# Patient Record
Sex: MALE | Race: WHITE | NOT HISPANIC OR LATINO | Employment: FULL TIME | URBAN - METROPOLITAN AREA
[De-identification: names, ages, dates, MRNs, and addresses within clinical notes are randomized per-mention and may not be internally consistent; named-entity substitution may affect disease eponyms.]

---

## 2018-02-06 ENCOUNTER — CONVERSION ENCOUNTER (OUTPATIENT)
Dept: FAMILY MEDICINE CLINIC | Facility: CLINIC | Age: 48
End: 2018-02-06

## 2018-02-06 ENCOUNTER — OFFICE VISIT CONVERTED (OUTPATIENT)
Dept: FAMILY MEDICINE CLINIC | Facility: CLINIC | Age: 48
End: 2018-02-06
Attending: PHYSICIAN ASSISTANT

## 2018-03-30 ENCOUNTER — OFFICE VISIT CONVERTED (OUTPATIENT)
Dept: FAMILY MEDICINE CLINIC | Facility: CLINIC | Age: 48
End: 2018-03-30
Attending: FAMILY MEDICINE

## 2018-08-02 ENCOUNTER — OFFICE VISIT CONVERTED (OUTPATIENT)
Dept: FAMILY MEDICINE CLINIC | Facility: CLINIC | Age: 48
End: 2018-08-02
Attending: NURSE PRACTITIONER

## 2018-08-24 ENCOUNTER — OFFICE VISIT CONVERTED (OUTPATIENT)
Dept: FAMILY MEDICINE CLINIC | Facility: CLINIC | Age: 48
End: 2018-08-24
Attending: PHYSICIAN ASSISTANT

## 2018-08-24 ENCOUNTER — CONVERSION ENCOUNTER (OUTPATIENT)
Dept: FAMILY MEDICINE CLINIC | Facility: CLINIC | Age: 48
End: 2018-08-24

## 2018-10-02 ENCOUNTER — OFFICE VISIT CONVERTED (OUTPATIENT)
Dept: FAMILY MEDICINE CLINIC | Facility: CLINIC | Age: 48
End: 2018-10-02
Attending: FAMILY MEDICINE

## 2018-11-19 ENCOUNTER — OFFICE VISIT CONVERTED (OUTPATIENT)
Dept: OTOLARYNGOLOGY | Facility: CLINIC | Age: 48
End: 2018-11-19
Attending: OTOLARYNGOLOGY

## 2019-03-25 ENCOUNTER — CONVERSION ENCOUNTER (OUTPATIENT)
Dept: FAMILY MEDICINE CLINIC | Facility: CLINIC | Age: 49
End: 2019-03-25

## 2019-03-25 ENCOUNTER — HOSPITAL ENCOUNTER (OUTPATIENT)
Dept: LAB | Facility: HOSPITAL | Age: 49
Discharge: HOME | End: 2019-03-25
Attending: FAMILY MEDICINE

## 2019-03-25 ENCOUNTER — OFFICE VISIT CONVERTED (OUTPATIENT)
Dept: FAMILY MEDICINE CLINIC | Facility: CLINIC | Age: 49
End: 2019-03-25
Attending: FAMILY MEDICINE

## 2019-03-25 LAB
APPEARANCE UR: ABNORMAL
BILIRUB UR QL: NEGATIVE
COLOR UR: YELLOW
CONV BACTERIA: NEGATIVE
CONV COLLECTION SOURCE (UA): ABNORMAL
CONV HYALINE CASTS IN URINE MICRO: ABNORMAL /[LPF]
CONV UROBILINOGEN IN URINE BY AUTOMATED TEST STRIP: 0.2 {EHRLICHU}/DL (ref 0.1–1)
GLUCOSE UR QL: NEGATIVE MG/DL
HGB UR QL STRIP: ABNORMAL
KETONES UR QL STRIP: NEGATIVE MG/DL
LEUKOCYTE ESTERASE UR QL STRIP: NEGATIVE
NITRITE UR QL STRIP: NEGATIVE
PH UR STRIP.AUTO: 5.5 [PH] (ref 5–8)
PROT UR QL: NEGATIVE MG/DL
RBC #/AREA URNS HPF: ABNORMAL /[HPF]
SP GR UR: 1.03 (ref 1–1.03)
WBC #/AREA URNS HPF: ABNORMAL /[HPF]

## 2019-05-03 ENCOUNTER — CONVERSION ENCOUNTER (OUTPATIENT)
Dept: GASTROENTEROLOGY | Facility: CLINIC | Age: 49
End: 2019-05-03
Attending: INTERNAL MEDICINE

## 2019-06-04 ENCOUNTER — HOSPITAL ENCOUNTER (OUTPATIENT)
Dept: GASTROENTEROLOGY | Facility: HOSPITAL | Age: 49
Setting detail: HOSPITAL OUTPATIENT SURGERY
Discharge: HOME | End: 2019-06-04
Attending: INTERNAL MEDICINE

## 2020-04-07 ENCOUNTER — TELEMEDICINE CONVERTED (OUTPATIENT)
Dept: FAMILY MEDICINE CLINIC | Facility: CLINIC | Age: 50
End: 2020-04-07
Attending: INTERNAL MEDICINE

## 2021-01-28 ENCOUNTER — OFFICE VISIT CONVERTED (OUTPATIENT)
Dept: FAMILY MEDICINE CLINIC | Age: 51
End: 2021-01-28
Attending: NURSE PRACTITIONER

## 2021-03-02 ENCOUNTER — OFFICE VISIT CONVERTED (OUTPATIENT)
Dept: FAMILY MEDICINE CLINIC | Age: 51
End: 2021-03-02
Attending: FAMILY MEDICINE

## 2021-04-03 ENCOUNTER — HOSPITAL ENCOUNTER (OUTPATIENT)
Dept: OTHER | Facility: HOSPITAL | Age: 51
Discharge: HOME | End: 2021-04-03
Attending: FAMILY MEDICINE

## 2021-04-03 LAB
25(OH)D3 SERPL-MCNC: 40.8 NG/ML (ref 30–100)
ALBUMIN SERPL-MCNC: 4.2 G/DL (ref 3.5–5)
ALBUMIN/GLOB SERPL: 1.8 {RATIO} (ref 1.4–2.6)
ALP SERPL-CCNC: 55 U/L (ref 53–128)
ALT SERPL-CCNC: 19 U/L (ref 10–40)
ANION GAP SERPL CALC-SCNC: 12 MMOL/L (ref 8–19)
AST SERPL-CCNC: 18 U/L (ref 15–50)
BASOPHILS # BLD AUTO: 0.03 10*3/UL (ref 0–0.2)
BASOPHILS NFR BLD AUTO: 0.4 % (ref 0–3)
BILIRUB SERPL-MCNC: 0.68 MG/DL (ref 0.2–1.3)
BUN SERPL-MCNC: 16 MG/DL (ref 5–25)
BUN/CREAT SERPL: 14 {RATIO} (ref 6–20)
CALCIUM SERPL-MCNC: 9.4 MG/DL (ref 8.7–10.4)
CHLORIDE SERPL-SCNC: 104 MMOL/L (ref 99–111)
CHOLEST SERPL-MCNC: 164 MG/DL (ref 107–200)
CHOLEST/HDLC SERPL: 4.1 {RATIO} (ref 3–6)
CONV ABS IMM GRAN: 0.01 10*3/UL (ref 0–0.2)
CONV CO2: 29 MMOL/L (ref 22–32)
CONV IMMATURE GRAN: 0.1 % (ref 0–1.8)
CONV TOTAL PROTEIN: 6.6 G/DL (ref 6.3–8.2)
CREAT UR-MCNC: 1.16 MG/DL (ref 0.7–1.2)
DEPRECATED RDW RBC AUTO: 42 FL (ref 35.1–43.9)
EOSINOPHIL # BLD AUTO: 0.07 10*3/UL (ref 0–0.7)
EOSINOPHIL # BLD AUTO: 1 % (ref 0–7)
ERYTHROCYTE [DISTWIDTH] IN BLOOD BY AUTOMATED COUNT: 11.9 % (ref 11.6–14.4)
GFR SERPLBLD BASED ON 1.73 SQ M-ARVRAT: >60 ML/MIN/{1.73_M2}
GLOBULIN UR ELPH-MCNC: 2.4 G/DL (ref 2–3.5)
GLUCOSE SERPL-MCNC: 91 MG/DL (ref 70–99)
HCT VFR BLD AUTO: 48.6 % (ref 42–52)
HDLC SERPL-MCNC: 40 MG/DL (ref 40–60)
HGB BLD-MCNC: 15.8 G/DL (ref 14–18)
LDLC SERPL CALC-MCNC: 106 MG/DL (ref 70–100)
LYMPHOCYTES # BLD AUTO: 2.25 10*3/UL (ref 1–5)
LYMPHOCYTES NFR BLD AUTO: 31.3 % (ref 20–45)
MCH RBC QN AUTO: 31.1 PG (ref 27–31)
MCHC RBC AUTO-ENTMCNC: 32.5 G/DL (ref 33–37)
MCV RBC AUTO: 95.7 FL (ref 80–96)
MONOCYTES # BLD AUTO: 0.57 10*3/UL (ref 0.2–1.2)
MONOCYTES NFR BLD AUTO: 7.9 % (ref 3–10)
NEUTROPHILS # BLD AUTO: 4.26 10*3/UL (ref 2–8)
NEUTROPHILS NFR BLD AUTO: 59.3 % (ref 30–85)
NRBC CBCN: 0 % (ref 0–0.7)
OSMOLALITY SERPL CALC.SUM OF ELEC: 291 MOSM/KG (ref 273–304)
PLATELET # BLD AUTO: 251 10*3/UL (ref 130–400)
PMV BLD AUTO: 10.9 FL (ref 9.4–12.4)
POTASSIUM SERPL-SCNC: 4.6 MMOL/L (ref 3.5–5.3)
RBC # BLD AUTO: 5.08 10*6/UL (ref 4.7–6.1)
SODIUM SERPL-SCNC: 140 MMOL/L (ref 135–147)
TRIGL SERPL-MCNC: 91 MG/DL (ref 40–150)
TSH SERPL-ACNC: 0.61 M[IU]/L (ref 0.27–4.2)
VLDLC SERPL-MCNC: 18 MG/DL (ref 5–37)
WBC # BLD AUTO: 7.19 10*3/UL (ref 4.8–10.8)

## 2021-05-12 NOTE — PROGRESS NOTES
Quick Note      Patient Name: Lavell Pires   Patient ID: [id number]   Sex: Male   YOB: 1970    Primary Care Provider: Villa Gomez DO    Visit Date: April 7, 2020    Provider: Villa Gomez DO   Location: [facility]   Location Address: [address] [place] [address], [address]  [place], [place]  [id number]   Location Phone: [phone]          History Of Present Illness  TELEHEALTH VISIT  Chief Complaint: Follow up   Lavell Pires is a 49 year old /White male who is presenting for evaluation via telehealth visit. Verbal consent obtained before beginning visit.   Provider spent 7 minutes with the patient during telehealth visit.   The following staff were present during this visit: Laura Meza/Villa Gomez DO   Past Medical History/Overview of Patient Symptoms     Follow up     Pt has no concerns at this time. We discussed his elevated LDL and measures that could be taken dietary wise to improve this number as well as his borderline HDL. We also discussed Niacin use.    Pt will need refills on medications.           Assessment  · Mixed hyperlipidemia     272.2/E78.2  · Hypertension     401.9/I10  · Allergic rhinitis     477.9/J30.9      Plan  · Orders  o CBC with Auto Diff [facility] ([id number]) - 401.9/I10, 272.2/E78.2 - 12/07/2020  o CMP [facility] ([id number]) - 401.9/I10, 272.2/E78.2 - 12/07/2020  o Hgb A1c [facility] ([id number]) - 401.9/I10 - 12/07/2020  o Lipid Panel [facility] ([id number]) - 272.2/E78.2 - 12/07/2020  o Physican Telephone evaluation, 5-10 min ([id number]) - 401.9/I10, 477.9/J30.9, 272.2/E78.2 - 04/07/2020  o ACO - Advance Care Plan or Surrogate Decision Maker documented in EMR (1123F) - - 04/07/2020  o ACO-14: Influenza immunization administered or previously received () - - 04/07/2020  · Instructions  o Advised that cheeses and other sources of dairy fats, animal fats, fast food, and the extras (candy, pastries, pies, doughnuts and cookies) all contain LDL raising nutrients. Advised to increase fruits,  vegetables, whole grains, and to monitor portion sizes.   o Plan Of Care: Discussed, Coordinated, Educated patient  o Chronic conditions reviewed and taken into consideration for today's treatment plan.  o Patient instructed to seek medical attention urgently for new or worsening symptoms.  o Patient is taking medications as prescribed and doing well.   o Take all medications as prescribed/directed.  o Patient instructed/educated on their diet and exercise program.  o Patient counseled to reduce calorie intake.  o Patient was instructed to exercise regularly.  o Call the office with any concerns or questions.  · Disposition  o Follow up in one year.            Electronically Signed by: Villa Gomez, DO -Author on April 7, 2020 09:32:29 AM

## 2021-05-15 VITALS
BODY MASS INDEX: 27.11 KG/M2 | SYSTOLIC BLOOD PRESSURE: 140 MMHG | WEIGHT: 183 LBS | HEART RATE: 83 BPM | HEIGHT: 69 IN | OXYGEN SATURATION: 97 % | DIASTOLIC BLOOD PRESSURE: 79 MMHG

## 2021-05-16 VITALS
WEIGHT: 186 LBS | TEMPERATURE: 97.3 F | HEIGHT: 69 IN | RESPIRATION RATE: 16 BRPM | BODY MASS INDEX: 27.55 KG/M2 | SYSTOLIC BLOOD PRESSURE: 130 MMHG | OXYGEN SATURATION: 100 % | HEART RATE: 69 BPM | DIASTOLIC BLOOD PRESSURE: 86 MMHG

## 2021-05-16 VITALS
HEART RATE: 66 BPM | DIASTOLIC BLOOD PRESSURE: 82 MMHG | HEIGHT: 69 IN | WEIGHT: 176.25 LBS | TEMPERATURE: 97.3 F | BODY MASS INDEX: 26.11 KG/M2 | SYSTOLIC BLOOD PRESSURE: 126 MMHG

## 2021-05-16 VITALS
HEART RATE: 74 BPM | SYSTOLIC BLOOD PRESSURE: 131 MMHG | WEIGHT: 183 LBS | TEMPERATURE: 98.1 F | DIASTOLIC BLOOD PRESSURE: 93 MMHG | OXYGEN SATURATION: 100 %

## 2021-05-16 VITALS
SYSTOLIC BLOOD PRESSURE: 118 MMHG | WEIGHT: 181 LBS | DIASTOLIC BLOOD PRESSURE: 80 MMHG | OXYGEN SATURATION: 100 % | HEART RATE: 60 BPM

## 2021-05-16 VITALS
TEMPERATURE: 98.3 F | WEIGHT: 180.12 LBS | HEART RATE: 65 BPM | HEIGHT: 69 IN | SYSTOLIC BLOOD PRESSURE: 136 MMHG | DIASTOLIC BLOOD PRESSURE: 86 MMHG | OXYGEN SATURATION: 100 % | BODY MASS INDEX: 26.68 KG/M2

## 2021-05-16 VITALS
WEIGHT: 182.37 LBS | HEIGHT: 69 IN | DIASTOLIC BLOOD PRESSURE: 77 MMHG | BODY MASS INDEX: 27.01 KG/M2 | HEART RATE: 66 BPM | SYSTOLIC BLOOD PRESSURE: 140 MMHG

## 2021-05-18 NOTE — PROGRESS NOTES
Lavell Pires  1970     Office/Outpatient Visit    Visit Date: Thu, Jan 28, 2021 09:34 am    Provider: Maria Del Rosario Parmar N.P. (Assistant: Dixie Arce MA)    Location: [facility]        Electronically signed by Maria Del Rosario Parmar N.P. on  01/28/2021 10:09:41 AM                             Subjective:        CC: Mr. Pires is a 50 year old male.  Medication refills (VIDEO call ) Today's encounter is being done with a telehealth visit. He has consented verbally with two witnesses for todays treatment. Todays visit is being conducted by audio and video. Individuals present during the telemedicine consultation include Dixie Arce MA         HPI:           PHQ-9 Depression Screening: Completed form scanned and in chart; Total Score 0           In regard to the allergic rhinitis, unspecified, the allergy pattern seems to be seasonal.  Symptoms seem better with Montelukast, antihistamine as needed.        Hx Vitamin D deficiency. Reports that he was started on Vitamin D supplement around 2015. Was having fatigue at the time. Requesting refill.       Lavell has history of sinus tachycardia. Reports first diagnosed around 2010. Had holter monitor and saw cardiology at the time. Has been on beta blockers since then. Reports heart rate now well controlled. Needing refill on medication.     ROS:     CONSTITUTIONAL:  Negative for chills and fever.      EYES:  Negative for blurred vision and eye drainage.      E/N/T:  Negative for ear pain and sore throat.      CARDIOVASCULAR:  Negative for chest pain and palpitations.      RESPIRATORY:  Negative for dyspnea and frequent wheezing.      NEUROLOGICAL:  Negative for dizziness and headaches.      PSYCHIATRIC:  Negative for depression and suicidal thoughts.          Past Medical History / Family History / Social History:         Last Reviewed on 1/28/2021 09:54 AM by Maria Del Rosario Parmar    Past Medical History: Dr Villa Gomez was former PCP              PREVENTIVE HEALTH MAINTENANCE             COLORECTAL CANCER SCREENING: Up to date (colonoscopy q10y; sigmoidoscopy q5y; Cologuard q3y) was last done ; colonoscopy with normal results; The next colonoscopy is due  5 years; [facility]         PAST MEDICAL HISTORY         Tachycardia- has been taking beta blocker since about  - saw cardiologist at the time when diagnosed with sinus tach     Allergies: seasonal;     Vitamin D deficiency - once weekly dosing - started around          Surgical History:         Vasectomy         Family History:     Father:  at age 75; Cause of death was COPD, PNA, smoker;  Alcoholism     Mother: Hypertension     Maternal uncles- cancer (esophageal, colon, prostate)         Social History:     Occupation: graphic designer     Marital Status:      Children: 2 children         Tobacco/Alcohol/Supplements:     Last Reviewed on 2021 09:35 AM by Dixie Arce    Tobacco: He has never smoked.          Alcohol: Frequency:    Rarely;         Current Problems:     Vitamin D deficiency, unspecified    Allergic rhinitis, unspecified    Tachycardia, unspecified    Encounter for screening for depression        Immunizations:     influenza, injectable, quadrivalent, preservative free 2021        Allergies:     Last Reviewed on 2021 09:35 AM by Dixie Arce    Bactrim: Rash         Current Medications:     Last Reviewed on 2021 09:36 AM by Dixie Arce    CARVEDILOL 3.125MG TABLETS  [TK 1 T PO BID]    VITAMIN D2 75982GP (ERGO) CAP RX  [TAKE 1 CAPSULE BY MOUTH 1 TIME WEEKLY]    MONTELUKAST 10MG TABLETS  [one tablet po qd]        Objective:        Exams:     PHYSICAL EXAM:     GENERAL: well developed, well nourished;  no apparent distress;     RESPIRATORY: normal appearance and symmetric expansion of chest wall; normal respiratory rate and pattern with no distress;     NEUROLOGIC: mental status: alert and oriented x 3;     PSYCHIATRIC: appropriate affect and  demeanor; normal speech pattern;         Assessment:         J30.9   Allergic rhinitis, unspecified       E55.9   Vitamin D deficiency, unspecified       R00.0   Tachycardia, unspecified       Z13.31   Encounter for screening for depression           ORDERS:         Meds Prescribed:       [Refilled] MONTELUKAST  10 mg  [one tablet po qd], #90 (ninety) each, Refills: 0 (zero)       [Refilled] carvediloL 3.125 mg oral tablet [Take one tablet twice daily ], #180 (one hundred and eighty) tablets, Refills: 0 (zero)       [Refilled] Vitamin D2 1,250 mcg (50,000 unit) oral capsule [TAKE 1 CAPSULE BY MOUTH 1 TIME WEEKLY], #12 (twelve) capsules, Refills: 0 (zero)         Other Orders:         Depression screen negative  (In-House)                      Plan:         Allergic rhinitis, unspecifiedStable. Will plan on having lab work ordered at next visit.    [facility] Vaccines Flu and Pneumonia updated in Shot record     FOLLOW-UP: Keep currently scheduled appointment.            Prescriptions:       [Refilled] MONTELUKAST  10 mg  [one tablet po qd], #90 (ninety) each, Refills: 0 (zero)         Vitamin D deficiency, unspecifiedTaking Vitamin D once weekly supplement. Stable          Prescriptions:       [Refilled] Vitamin D2 1,250 mcg (50,000 unit) oral capsule [TAKE 1 CAPSULE BY MOUTH 1 TIME WEEKLY], #12 (twelve) capsules, Refills: 0 (zero)         Tachycardia, unspecifiedReports hx of Sinus tach, controlled on beta blocker. Stable.          Prescriptions:       [Refilled] carvediloL 3.125 mg oral tablet [Take one tablet twice daily ], #180 (one hundred and eighty) tablets, Refills: 0 (zero)         Encounter for screening for depression    [facility] PHQ-9 Depression Screening: Completed form scanned and in chart; Total Score 0; Negative Depression Screen           Orders:         Depression screen negative  (In-House)                  Charge Capture:         Primary Diagnosis:     J30.9  Allergic rhinitis, unspecified            Orders:      [id number]  Office visit - new pt, level 3  (In-House)              E55.9  Vitamin D deficiency, unspecified     R00.0  Tachycardia, unspecified     Z13.31  Encounter for screening for depression           Orders:        Depression screen negative  (In-House)                  ADDENDUMS:      ____________________________________    Addendum: 02/01/2021 08:11 PM - Maria Del Rosario Parmar        Remove [id number]    Add [id number]    AC

## 2021-05-18 NOTE — PROGRESS NOTES
Lavell Pires  1970     Office/Outpatient Visit    Visit Date: Tue, Mar 2, 2021 12:49 pm    Provider: Skyler Treviño MD (Assistant: Scott Jean, )    Location: [facility]        Electronically signed by Skyler Treviño MD on  03/02/2021 01:29:28 PM                             Subjective:        CC: Mr. Pires is a 50 year old male.  establish care         HPI:           PHQ-9 Depression Screening: Completed form scanned and in chart; Total Score 1       Pertaining to vitamin D deficiency, Lavell takes vitamin D2 50,000 units weekly.  He cannot recall what his last vitamin D level was.        Pertaining to allergic rhinitis, Lavell says that his symptoms are persistent but stable.  His current regimen includes Singulair 10 mg daily and an over-the-counter antihistamine as needed.        Pertaining to tachycardia, where he was diagnosed many years ago.  He ultimately saw a cardiologist and had a Holter monitor which deemed that he had sinus tachycardia.  This is well controlled with carvedilol 3.125 mg twice daily.  He denies chest pain, shortness of breath, MONTANEZ or palpitations.        Finally, Lavell reports a history of erectile dysfunction.  He endorses issues with both achieving and maintaining erection.  He said that he was previously prescribed sildenafil but that his insurance company denied this claim.  He has recently changed insurances and would like to try a prescription for this medication again.    ROS:     CONSTITUTIONAL:  Negative for chills, fatigue and fever.      EYES:  Negative for blurred vision.      E/N/T:  Negative for ear pain, tinnitus, nasal congestion, frequent rhinorrhea and sinus pressure.      CARDIOVASCULAR:  Positive for tachycardia ( controlled with carvedilol ).   Negative for chest pain, dizziness, palpitations or edema.      RESPIRATORY:  Negative for dyspnea and cough.      GASTROINTESTINAL:  Negative for abdominal pain, constipation, diarrhea, heartburn,  nausea and vomiting.      GENITOURINARY:  Positive for erectile dysfunction.   Negative for dysuria, hematuria or polyuria.      MUSCULOSKELETAL:  Negative for arthralgias and myalgias.      INTEGUMENTARY:  Negative for rash.      NEUROLOGICAL:  Negative for headaches, paresthesias and weakness.      HEMATOLOGIC/LYMPHATIC:  Negative for easy bruising and excessive bleeding.      ENDOCRINE:  Negative for hair loss, heat/cold intolerance, polydipsia, and polyphagia.      PSYCHIATRIC:  Negative for anxiety, depression, and sleep disturbances.          Past Medical History / Family History / Social History:         Last Reviewed on 3/02/2021 01:29 PM by Skyler Treviño    Past Medical History: Dr Villa Gomez was former PCP             PREVENTIVE HEALTH MAINTENANCE             COLORECTAL CANCER SCREENING: Up to date (colonoscopy q10y; sigmoidoscopy q5y; Cologuard q3y) was last done ; colonoscopy with normal results; The next colonoscopy is due  5 years; [facility]         PAST MEDICAL HISTORY         Tachycardia- has been taking beta blocker since about  - saw cardiologist at the time when diagnosed with sinus tach     Allergies: seasonal;     Vitamin D deficiency - once weekly dosing - started around          Surgical History:         Vasectomy         Family History:     Father:  at age 75; Cause of death was COPD, PNA, smoker;  Alcoholism     Mother: Hypertension     Maternal uncles- cancer (esophageal, colon, prostate)         Social History:     Occupation:      Marital Status:      Children: 2 children         Tobacco/Alcohol/Supplements:     Last Reviewed on 3/02/2021 01:29 PM by Skyler Treviño    Tobacco: He has never smoked.          Alcohol: Frequency:    Rarely;         Substance Abuse History:     Last Reviewed on 3/02/2021 01:29 PM by Skyler Treviño        Mental Health History:     Last Reviewed on 3/02/2021 01:29 PM by Skyler Treviño        Communicable Diseases (eg STDs):      "Last Reviewed on 3/02/2021 01:29 PM by Skyler Treviño        Current Problems:     Last Reviewed on 3/02/2021 01:29 PM by Skyler Treviño    Vitamin D deficiency, unspecified    Allergic rhinitis, unspecified    Tachycardia, unspecified    Encounter for screening for depression    Male erectile dysfunction, unspecified        Immunizations:     influenza, injectable, quadrivalent, preservative free 1/28/2021        Allergies:     Last Reviewed on 3/02/2021 01:29 PM by Skyler Treviño    Bactrim: Rash         Current Medications:     Last Reviewed on 3/02/2021 01:29 PM by Skyler Treviño    carvediloL 3.125 mg oral tablet [Take one tablet twice daily ]    Vitamin D2 1,250 mcg (50,000 unit) oral capsule [TAKE 1 CAPSULE BY MOUTH 1 TIME WEEKLY]    MONTELUKAST  10 mg  [one tablet po qd]        Objective:        Vitals:         Current: 3/2/2021 12:56:26 PM    Ht:  5 ft, 8.5 in;  Wt: 174.8 lbs;  BMI: 26.2T: 97.6 F (temporal);  BP: 137/81 mm Hg (left arm, sitting);  P: 83 bpm (left arm (BP Cuff), sitting)        Exams:     PHYSICAL EXAM:     GENERAL: Vitals recorded well developed, well nourished;  no apparent distress;     EYES: conjunctiva and cornea are normal;     E/N/T:  normal EACs, TMs, nasal/oral mucosa, teeth, gingiva, and oropharynx;     NECK: trachea is midline; thyroid is non-palpable;     RESPIRATORY: Clear to auscultation bilateally; no rales (\"crackles\") present; no rhonchi; no wheezes;     CARDIOVASCULAR: normal rate; rhythm is regular;  No murmurs, clicks, gallops or rubs appreciated; no edema;     GASTROINTESTINAL: nontender; Soft and nondistended; normal bowel sounds; no organomegaly; no masses;     LYMPHATIC: no enlargement of cervical or facial nodes; no supraclavicular nodes;     SKIN:  No significant rashes, lesions or suspicious moles within limits of examination;     MUSCULOSKELETAL: muscle strength: 5/5 in all major muscle groups;  normal overall tone     NEUROLOGIC: Grossly intact; mental status: alert " and oriented x 3;     PSYCHIATRIC: appropriate affect and demeanor; normal speech pattern; Normal behavior;         Assessment:         E55.9   Vitamin D deficiency, unspecified       J30.9   Allergic rhinitis, unspecified       R00.0   Tachycardia, unspecified       N52.9   Male erectile dysfunction, unspecified       Z13.31   Encounter for screening for depression           ORDERS:         Meds Prescribed:       [New Rx] sildenafil (pulm.hypertension) 20 mg oral tablet [take 2-3 tablets (40-60 mg) by oral route approximately 1 hour before sexual activity], #10 (ten) tablets, Refills: 0 (zero)         Lab Orders:       [id number]  [facility] PHYSICAL: CMP, CBC, TSH, LIPID: [id number], [id number], [id number], [id number]  (Send-Out)            [id number]  [facility] - [facility] Vitamin D, 25 Hydroxy  (Send-Out)              Other Orders:         Depression screen negative  (In-House)                      Plan:         Vitamin D deficiency, unspecified- Unknown control.  Continue vitamin D2 50,000 units weekly.  Vitamin D level ordered today.          Orders:       [id number]  [facility] Vitamin D, 25 Hydroxy  (Send-Out)              Allergic rhinitis, unspecifiedStable.  Continue Singulair 10 mg nightly and an over-the-counter histamine daily..        Tachycardia, unspecifiedStable.  Continue carvedilol 3.125 mg twice daily.    LABORATORY:  Labs ordered to be performed today include PHYSICAL PANEL; CMP, CBC, TSH, LIPID.            Orders:       [id number]  [facility] PHYSICAL: CMP, CBC, TSH, LIPID: [id number], [id number], [id number], [id number]  (Send-Out)              Male erectile dysfunction, unspecifiedNot controlled.  Start sildenafil 40 to 60 mg daily as needed before sexual activity          Prescriptions:       [New Rx] sildenafil (pulm.hypertension) 20 mg oral tablet [take 2-3 tablets (40-60 mg) by oral route approximately 1 hour before sexual activity], #10 (ten) tablets, Refills: 0 (zero)         Encounter for screening for depression    MIPS PHQ-9 Depression Screening:  Completed form scanned and in chart; Total Score 1; Negative Depression Screen           Orders:         Depression screen negative  (In-House)                  Charge Capture:         Primary Diagnosis:     E55.9  Vitamin D deficiency, unspecified           Orders:      [id number]  Office/outpatient visit; established patient, level 4  (In-House)              J30.9  Allergic rhinitis, unspecified     R00.0  Tachycardia, unspecified     N52.9  Male erectile dysfunction, unspecified     Z13.31  Encounter for screening for depression           Orders:        Depression screen negative  (In-House)

## 2021-07-02 VITALS
BODY MASS INDEX: 25.89 KG/M2 | HEART RATE: 83 BPM | SYSTOLIC BLOOD PRESSURE: 137 MMHG | HEIGHT: 69 IN | DIASTOLIC BLOOD PRESSURE: 81 MMHG | WEIGHT: 174.8 LBS | TEMPERATURE: 97.6 F

## 2021-07-02 RX ORDER — ERGOCALCIFEROL 1.25 MG/1
50000 CAPSULE ORAL
Qty: 12 CAPSULE | Refills: 1 | Status: SHIPPED | OUTPATIENT
Start: 2021-07-02 | End: 2022-01-10 | Stop reason: SDUPTHER

## 2021-07-02 RX ORDER — ERGOCALCIFEROL 1.25 MG/1
50000 CAPSULE ORAL
COMMUNITY
Start: 2021-04-06 | End: 2021-07-02 | Stop reason: SDUPTHER

## 2021-07-02 NOTE — TELEPHONE ENCOUNTER
Caller: Lavell Pires    Relationship: Self    Best call back number: [phone]     Medication needed: VITAMIN D    When do you need the refill by: ASAP    What additional details did the patient provide when requesting the medication: PATIENT STATES HE HAS A WEEK LEFT     Does the patient have less than a 3 day supply:  [] Yes  [x] No    What is the patient's preferred pharmacy: [id number] - [place], [place] - [address] AT [facility] OF [address] [facility] [address] [facility] [address] - [phone]  - [phone] FX

## 2021-07-02 NOTE — TELEPHONE ENCOUNTER
LAST OV: 3/2/21  NEXT OV: 3/8/22  LAST REFILL: 5/18/21 #12, RF-1  LAST LAB: 4/3/21 level was 40.8    PLEASE SIGN OR ADVISE      Vitamin D 25 Hydroxy  Order: [id number]  Status:  Final result   Visible to patient:  No (not released)   Next appt:  03/08/2022 at 01:00 PM in Family Medicine (Skyler Treviño MD)       Component   Ref Range & Units 3 mo ago   25 Hydroxy, Vitamin D   30.0 - 100.0 ng/mL 40.8    Comment: Vitamin D Status          Range   ---------------------------------------   Deficiency                <10 ng/mL   Insufficiency             10-30 ng/mL   Sufficiency                ng/mL   Toxicity                  >100 ng/mL          Specimen Collected: 04/03/21 09:54   Last Resulted: 04/03/21 14:48

## 2021-09-15 ENCOUNTER — OFFICE VISIT (OUTPATIENT)
Dept: FAMILY MEDICINE CLINIC | Age: 51
End: 2021-09-15

## 2021-09-15 ENCOUNTER — HOSPITAL ENCOUNTER (OUTPATIENT)
Dept: GENERAL RADIOLOGY | Facility: HOSPITAL | Age: 51
Discharge: HOME | End: 2021-09-15
Admitting: NURSE PRACTITIONER

## 2021-09-15 VITALS
DIASTOLIC BLOOD PRESSURE: 90 MMHG | BODY MASS INDEX: 25.62 KG/M2 | TEMPERATURE: 98.9 F | WEIGHT: 173 LBS | HEART RATE: 68 BPM | HEIGHT: 69 IN | SYSTOLIC BLOOD PRESSURE: 128 MMHG

## 2021-09-15 DIAGNOSIS — R10.33 PERIUMBILICAL ABDOMINAL PAIN: Primary | ICD-10-CM

## 2021-09-15 DIAGNOSIS — R10.33 PERIUMBILICAL ABDOMINAL PAIN: ICD-10-CM

## 2021-09-15 PROBLEM — I10 ESSENTIAL (PRIMARY) HYPERTENSION: Status: ACTIVE | Noted: 2021-09-15

## 2021-09-15 PROBLEM — R26.89 LOSS OF BALANCE: Status: ACTIVE | Noted: 2021-09-15

## 2021-09-15 PROBLEM — R42 VERTIGO: Status: ACTIVE | Noted: 2021-09-15

## 2021-09-15 PROBLEM — H54.3: Status: ACTIVE | Noted: 2021-09-15

## 2021-09-15 PROBLEM — N52.9 MALE ERECTILE DYSFUNCTION, UNSPECIFIED: Status: ACTIVE | Noted: 2021-09-15

## 2021-09-15 PROBLEM — R05.3 CHRONIC COUGH: Status: ACTIVE | Noted: 2021-09-15

## 2021-09-15 PROBLEM — R00.0 TACHYCARDIA: Status: ACTIVE | Noted: 2021-09-15

## 2021-09-15 PROCEDURE — 74018 RADEX ABDOMEN 1 VIEW: CPT

## 2021-09-15 PROCEDURE — 99213 OFFICE O/P EST LOW 20 MIN: CPT | Performed by: NURSE PRACTITIONER

## 2021-09-15 RX ORDER — MONTELUKAST SODIUM 10 MG/1
10 TABLET ORAL NIGHTLY
COMMUNITY

## 2021-09-15 RX ORDER — CARVEDILOL 3.12 MG/1
3.12 TABLET ORAL 2 TIMES DAILY
COMMUNITY
Start: 2021-08-10 | End: 2021-11-04

## 2021-09-15 RX ORDER — SILDENAFIL 50 MG/1
50 TABLET, FILM COATED ORAL DAILY PRN
COMMUNITY
End: 2021-12-02

## 2021-09-15 NOTE — PROGRESS NOTES
"Chief Complaint  Abdominal Pain (pain around naval area when coughing X 4-5 weeks)    Subjective          Lavell Pires presents to [facility]    Lavell reports that he has been experiencing some pain in umbilical area intermittently for past 6 weeks ago. Started taking daily probiotic about 6 weeks ago. Reports that he typically does not have a bowel movement but every other day. This has not changed and appearance of stool has not changed. He denies burping, belching, or heartburn. Notes that massaging his belly will sometimes help. Has history of allergies. With dry cough with allergies, will sometimes have pain. He had colonoscopy 6/4/2019 and advised repeat 5 years. He has no history of abdominal surgies.           Objective   Vital Signs:   /90 (BP Location: Right arm, Patient Position: Sitting)   Pulse 68   Temp 98.9 °F (37.2 °C) (Oral)   Ht 174 cm (68.5\")   Wt 78.5 kg (173 lb)   BMI 25.92 kg/m²       Physical Exam       Result Review :   The following data was reviewed by: TC Zepeda on 09/15/2021:  Vitamin D 25 Hydroxy (04/03/2021 09:54)  Physical Test Panel (04/03/2021 09:54)           Assessment and Plan    There are no diagnoses linked to this encounter.    Follow Up    No follow-ups on file.  Patient was given instructions and counseling regarding his condition or for health maintenance advice. Please see specific information pulled into the AVS if appropriate.   "

## 2021-09-15 NOTE — PROGRESS NOTES
"Chief Complaint  Abdominal Pain (pain around naval area when coughing X 4-5 weeks)    Subjective          Lavell Pires presents to [facility]    Lavell reports that he has been experiencing some pain in umbilical abdominal area intermittently for past 6 weeks. Has tarted taking daily probiotic. Reports that he typically does not have a bowel movement but every other day. This has not changed and appearance of stool has not changed. He denies burping, belching, heartburn, or urinary symptoms. Notes that massaging his belly will sometimes help. Has history of allergies. With dry cough with allergies, will sometimes have pain in abdomen. He had colonoscopy 6/4/2019 and advised repeat 5 years due to polyp. He has no history of abdominal surgies.           Objective   Vital Signs:   /90 (BP Location: Right arm, Patient Position: Sitting)   Pulse 68   Temp 98.9 °F (37.2 °C) (Oral)   Ht 174 cm (68.5\")   Wt 78.5 kg (173 lb)   BMI 25.92 kg/m²       Physical Exam  Vitals reviewed.   Constitutional:       General: He is not in acute distress.     Appearance: Normal appearance. He is well-developed.   HENT:      Head: Normocephalic and atraumatic.   Cardiovascular:      Rate and Rhythm: Normal rate and regular rhythm.   Pulmonary:      Effort: Pulmonary effort is normal.      Breath sounds: Normal breath sounds.   Abdominal:      General: Bowel sounds are normal.      Palpations: Abdomen is soft.      Tenderness: There is no abdominal tenderness.   Neurological:      Mental Status: He is alert and oriented to person, place, and time.   Psychiatric:         Mood and Affect: Mood and affect normal.            Result Review :   The following data was reviewed by: TC Zepeda on 09/15/2021:  Vitamin D 25 Hydroxy (04/03/2021 09:54)  Physical Test Panel (04/03/2021 09:54)           Assessment and Plan    Diagnoses and all orders for this visit:    1. Periumbilical abdominal pain " (Primary)  -     CBC Auto Differential; Future  -     Comprehensive Metabolic Panel; Future  -     Urinalysis With Culture If Indicated -; Future  -     XR Abdomen KUB; Future    Discussed differential to include but not limited to constipation, muscle strain, renal stone.  We will proceed with labs and x-ray.  May take Metamucil per package instructions.  May also take Colace as needed if straining to have bowel movement.  Follow-up based on findings and symptom progression.      Follow Up    Return for Chronic visit follow up.  Patient was given instructions and counseling regarding his condition or for health maintenance advice. Please see specific information pulled into the AVS if appropriate.

## 2021-09-16 ENCOUNTER — LAB (OUTPATIENT)
Dept: LAB | Facility: HOSPITAL | Age: 51
End: 2021-09-16

## 2021-09-16 DIAGNOSIS — R10.33 PERIUMBILICAL ABDOMINAL PAIN: ICD-10-CM

## 2021-09-16 LAB
ALBUMIN SERPL-MCNC: 4.5 G/DL (ref 3.5–5.2)
ALBUMIN/GLOB SERPL: 1.8 G/DL
ALP SERPL-CCNC: 56 U/L (ref 39–117)
ALT SERPL W P-5'-P-CCNC: 13 U/L (ref 1–41)
ANION GAP SERPL CALCULATED.3IONS-SCNC: 8.5 MMOL/L (ref 5–15)
AST SERPL-CCNC: 20 U/L (ref 1–40)
BASOPHILS # BLD AUTO: 0.03 10*3/MM3 (ref 0–0.2)
BASOPHILS NFR BLD AUTO: 0.4 % (ref 0–1.5)
BILIRUB SERPL-MCNC: 0.6 MG/DL (ref 0–1.2)
BILIRUB UR QL STRIP: NEGATIVE
BUN SERPL-MCNC: 17 MG/DL (ref 6–20)
BUN/CREAT SERPL: 14.5 (ref 7–25)
CALCIUM SPEC-SCNC: 9.3 MG/DL (ref 8.6–10.5)
CHLORIDE SERPL-SCNC: 102 MMOL/L (ref 98–107)
CLARITY UR: CLEAR
CO2 SERPL-SCNC: 28.5 MMOL/L (ref 22–29)
COLOR UR: YELLOW
CREAT SERPL-MCNC: 1.17 MG/DL (ref 0.76–1.27)
DEPRECATED RDW RBC AUTO: 38.9 FL (ref 37–54)
EOSINOPHIL # BLD AUTO: 0.1 10*3/MM3 (ref 0–0.4)
EOSINOPHIL NFR BLD AUTO: 1.2 % (ref 0.3–6.2)
ERYTHROCYTE [DISTWIDTH] IN BLOOD BY AUTOMATED COUNT: 11.7 % (ref 12.3–15.4)
GFR SERPL CREATININE-BSD FRML MDRD: 66 ML/MIN/1.73
GLOBULIN UR ELPH-MCNC: 2.5 GM/DL
GLUCOSE SERPL-MCNC: 93 MG/DL (ref 65–99)
GLUCOSE UR STRIP-MCNC: NEGATIVE MG/DL
HCT VFR BLD AUTO: 46.9 % (ref 37.5–51)
HGB BLD-MCNC: 15.5 G/DL (ref 13–17.7)
HGB UR QL STRIP.AUTO: NEGATIVE
IMM GRANULOCYTES # BLD AUTO: 0.02 10*3/MM3 (ref 0–0.05)
IMM GRANULOCYTES NFR BLD AUTO: 0.2 % (ref 0–0.5)
KETONES UR QL STRIP: NEGATIVE
LEUKOCYTE ESTERASE UR QL STRIP.AUTO: NEGATIVE
LYMPHOCYTES # BLD AUTO: 2.88 10*3/MM3 (ref 0.7–3.1)
LYMPHOCYTES NFR BLD AUTO: 34.9 % (ref 19.6–45.3)
MCH RBC QN AUTO: 30.7 PG (ref 26.6–33)
MCHC RBC AUTO-ENTMCNC: 33 G/DL (ref 31.5–35.7)
MCV RBC AUTO: 92.9 FL (ref 79–97)
MONOCYTES # BLD AUTO: 0.72 10*3/MM3 (ref 0.1–0.9)
MONOCYTES NFR BLD AUTO: 8.7 % (ref 5–12)
NEUTROPHILS NFR BLD AUTO: 4.5 10*3/MM3 (ref 1.7–7)
NEUTROPHILS NFR BLD AUTO: 54.6 % (ref 42.7–76)
NITRITE UR QL STRIP: NEGATIVE
NRBC BLD AUTO-RTO: 0 /100 WBC (ref 0–0.2)
PH UR STRIP.AUTO: 6 [PH] (ref 5–8)
PLATELET # BLD AUTO: 245 10*3/MM3 (ref 140–450)
PMV BLD AUTO: 11.7 FL (ref 6–12)
POTASSIUM SERPL-SCNC: 4.2 MMOL/L (ref 3.5–5.2)
PROT SERPL-MCNC: 7 G/DL (ref 6–8.5)
PROT UR QL STRIP: NEGATIVE
RBC # BLD AUTO: 5.05 10*6/MM3 (ref 4.14–5.8)
SODIUM SERPL-SCNC: 139 MMOL/L (ref 136–145)
SP GR UR STRIP: 1.03 (ref 1–1.03)
UROBILINOGEN UR QL STRIP: NORMAL
WBC # BLD AUTO: 8.25 10*3/MM3 (ref 3.4–10.8)

## 2021-09-16 PROCEDURE — 36415 COLL VENOUS BLD VENIPUNCTURE: CPT

## 2021-09-16 PROCEDURE — 81003 URINALYSIS AUTO W/O SCOPE: CPT

## 2021-09-16 PROCEDURE — 85025 COMPLETE CBC W/AUTO DIFF WBC: CPT

## 2021-09-16 PROCEDURE — 80053 COMPREHEN METABOLIC PANEL: CPT

## 2021-09-17 ENCOUNTER — TELEPHONE (OUTPATIENT)
Dept: FAMILY MEDICINE CLINIC | Age: 51
End: 2021-09-17

## 2021-09-17 NOTE — TELEPHONE ENCOUNTER
HUB TO READ    PT WAS CALLING IN ABOUT HIS LAB RESULTS, HE SAID DANIEL WORTHINGTON WAS THE PROVIDER THAT ORDERED THEM.

## 2021-09-30 ENCOUNTER — TELEPHONE (OUTPATIENT)
Dept: FAMILY MEDICINE CLINIC | Age: 51
End: 2021-09-30

## 2021-09-30 DIAGNOSIS — R10.33 PERIUMBILICAL ABDOMINAL PAIN: Primary | ICD-10-CM

## 2021-10-04 ENCOUNTER — TELEPHONE (OUTPATIENT)
Dept: FAMILY MEDICINE CLINIC | Age: 51
End: 2021-10-04

## 2021-10-06 ENCOUNTER — TELEPHONE (OUTPATIENT)
Dept: FAMILY MEDICINE CLINIC | Age: 51
End: 2021-10-06

## 2021-10-06 ENCOUNTER — APPOINTMENT (OUTPATIENT)
Dept: CT IMAGING | Facility: HOSPITAL | Age: 51
End: 2021-10-06

## 2021-10-08 DIAGNOSIS — R10.33 PERIUMBILICAL ABDOMINAL PAIN: Primary | ICD-10-CM

## 2021-10-08 RX ORDER — OMEPRAZOLE 40 MG/1
40 CAPSULE, DELAYED RELEASE ORAL DAILY
Qty: 90 CAPSULE | Refills: 0 | Status: SHIPPED | OUTPATIENT
Start: 2021-10-08 | End: 2021-12-02

## 2021-10-12 ENCOUNTER — APPOINTMENT (OUTPATIENT)
Dept: CT IMAGING | Facility: HOSPITAL | Age: 51
End: 2021-10-12

## 2021-10-15 ENCOUNTER — OFFICE VISIT (OUTPATIENT)
Dept: FAMILY MEDICINE CLINIC | Age: 51
End: 2021-10-15

## 2021-10-15 VITALS
OXYGEN SATURATION: 100 % | HEIGHT: 69 IN | SYSTOLIC BLOOD PRESSURE: 145 MMHG | WEIGHT: 175.2 LBS | DIASTOLIC BLOOD PRESSURE: 96 MMHG | BODY MASS INDEX: 25.95 KG/M2 | HEART RATE: 66 BPM | TEMPERATURE: 98 F

## 2021-10-15 DIAGNOSIS — Z23 ENCOUNTER FOR IMMUNIZATION: ICD-10-CM

## 2021-10-15 DIAGNOSIS — R19.04 LLQ ABDOMINAL MASS: Primary | ICD-10-CM

## 2021-10-15 PROCEDURE — 90686 IIV4 VACC NO PRSV 0.5 ML IM: CPT | Performed by: NURSE PRACTITIONER

## 2021-10-15 PROCEDURE — 90471 IMMUNIZATION ADMIN: CPT | Performed by: NURSE PRACTITIONER

## 2021-10-15 PROCEDURE — 99214 OFFICE O/P EST MOD 30 MIN: CPT | Performed by: NURSE PRACTITIONER

## 2021-10-15 NOTE — PROGRESS NOTES
Chief Complaint  Lavell Pires presents to [facility] for GI Problem    Subjective          History of Present Illness    Lavell is here today for follow up on abdominal symptoms. Seen approximately 4 weeks ago. Xray showed a small amount of stool but was otherwise normal. Notes that recently he has been experiencing symptoms between 4 and 8. Has not noticed any triggers for pain. Has mass that develops in LLQ. Was coughing yesterday and felt a pop in that same area. This made the mass improve. He has been taking colace occasionally. Reports BMs normal. Starting the colace has not improved symptoms but has improved regular BMs. No nausea, vomiting, or diarrhea. No fever. No weight loss. He had colonoscopy 6/4/2019 and advised repeat 5 years due to polyp. He has no history of abdominal surgies. Has not been taking PPI.  He is scheduled for US on Monday. CT was previously ordered and denied. PA was scheduled but this provider was never contacted by insurance to complete peer to peer at scheduled time/day.      Review of Systems      Allergies   Allergen Reactions   • Sulfamethoxazole-Trimethoprim Rash      History reviewed. No pertinent past medical history.  Current Outpatient Medications   Medication Sig Dispense Refill   • carvedilol (COREG) 3.125 MG tablet Take 3.125 mg by mouth 2 (Two) Times a Day.     • montelukast (SINGULAIR) 10 MG tablet Take 10 mg by mouth Every Night.     • omeprazole (priLOSEC) 40 MG capsule Take 1 capsule by mouth Daily. 90 capsule 0   • vitamin D (ERGOCALCIFEROL) 1.25 MG (30751 UT) capsule capsule Take 1 capsule by mouth Every 7 (Seven) Days. 12 capsule 1   • sildenafil (VIAGRA) 50 MG tablet Take 50 mg by mouth Daily As Needed for Erectile Dysfunction.       No current facility-administered medications for this visit.     History reviewed. No pertinent surgical history.   Social History     Tobacco Use   • Smoking status: Never Smoker   • Smokeless  "tobacco: Never Used   Vaping Use   • Vaping Use: Never used   Substance Use Topics   • Alcohol use: Not on file   • Drug use: Not on file     History reviewed. No pertinent family history.  Health Maintenance Due   Topic Date Due   • ANNUAL PHYSICAL  Never done   • HEPATITIS C SCREENING  Never done   • ZOSTER VACCINE (1 of 2) Never done      Immunization History   Administered Date(s) Administered   • COVID-19 (PFIZER) 03/25/2021, 04/16/2021   • FluLaval/Fluarix/Fluzone >6 10/15/2021   • Flucelvax Quad Vial =>4yrs 01/28/2021   • Tdap 12/23/2016   • flucelvax quad pfs =>4 YRS 01/28/2021        Objective     Vitals:    10/15/21 1355   BP: 145/96   Pulse: 66   Temp: 98 °F (36.7 °C)   SpO2: 100%   Weight: 79.5 kg (175 lb 3.2 oz)   Height: 175.3 cm (69\")     Body mass index is 25.87 kg/m².     Physical Exam  Vitals reviewed.   Constitutional:       General: He is not in acute distress.     Appearance: Normal appearance. He is well-developed.   HENT:      Head: Normocephalic and atraumatic.   Cardiovascular:      Rate and Rhythm: Normal rate and regular rhythm.   Pulmonary:      Effort: Pulmonary effort is normal.      Breath sounds: Normal breath sounds.   Abdominal:      General: Bowel sounds are normal.      Palpations: Abdomen is soft.      Tenderness: There is no abdominal tenderness.   Neurological:      Mental Status: He is alert and oriented to person, place, and time.   Psychiatric:         Mood and Affect: Mood and affect normal.           Result Review :     The following data was reviewed by: TC Zepeda on 10/15/2021:          Urinalysis With Culture If Indicated - Urine, Clean Catch (09/16/2021 17:38)  Comprehensive Metabolic Panel (09/16/2021 17:38)  CBC Auto Differential (09/16/2021 17:38)       XR Abdomen KUB (09/15/2021 17:28)             Assessment and Plan      Diagnoses and all orders for this visit:    1. LLQ abdominal mass (Primary)  Comments:  No palpable abnormality noted on exam today " but symptoms similar to hernia.   Orders:  -     US Abdomen Limited; Future    2. Encounter for immunization  -     FluLaval/Fluarix/Fluzone >6 Months ([id number])      Have added order for abdominal wall US to rule out hernia along with the complete US that was ordered. Contacted US who can add to scheduled appt on Monday if pt arrives at 3:30. Contacted scheduling who will add to 3:30 schedule. Called patient and updated to arrive at 3:30. Remain NPO 8 hours prior to appt.   Consider CT, GI, or gen surg referral as needed.           Follow Up     Return for after testing.

## 2021-10-18 ENCOUNTER — HOSPITAL ENCOUNTER (OUTPATIENT)
Dept: ULTRASOUND IMAGING | Facility: HOSPITAL | Age: 51
Discharge: HOME | End: 2021-10-18

## 2021-10-18 DIAGNOSIS — R10.33 PERIUMBILICAL ABDOMINAL PAIN: ICD-10-CM

## 2021-10-18 DIAGNOSIS — R19.04 LLQ ABDOMINAL MASS: ICD-10-CM

## 2021-10-18 PROCEDURE — 76705 ECHO EXAM OF ABDOMEN: CPT

## 2021-10-18 PROCEDURE — 76700 US EXAM ABDOM COMPLETE: CPT

## 2021-10-21 DIAGNOSIS — K40.90 UNILATERAL INGUINAL HERNIA WITHOUT OBSTRUCTION OR GANGRENE, RECURRENCE NOT SPECIFIED: Primary | ICD-10-CM

## 2021-11-02 ENCOUNTER — OFFICE VISIT (OUTPATIENT)
Dept: SURGERY | Facility: CLINIC | Age: 51
End: 2021-11-02

## 2021-11-02 ENCOUNTER — PREP FOR SURGERY (OUTPATIENT)
Dept: OTHER | Facility: HOSPITAL | Age: 51
End: 2021-11-02

## 2021-11-02 VITALS — HEIGHT: 69 IN | WEIGHT: 174.8 LBS | RESPIRATION RATE: 16 BRPM | BODY MASS INDEX: 25.89 KG/M2

## 2021-11-02 DIAGNOSIS — K40.90 LEFT INGUINAL HERNIA: Primary | ICD-10-CM

## 2021-11-02 PROCEDURE — 99203 OFFICE O/P NEW LOW 30 MIN: CPT | Performed by: SURGERY

## 2021-11-02 RX ORDER — CEFAZOLIN SODIUM 2 G/100ML
2 INJECTION, SOLUTION INTRAVENOUS ONCE
Status: CANCELLED | OUTPATIENT
Start: 2021-11-02 | End: 2021-11-02

## 2021-11-02 RX ORDER — SODIUM CHLORIDE 0.9 % (FLUSH) 0.9 %
10 SYRINGE (ML) INJECTION AS NEEDED
Status: CANCELLED | OUTPATIENT
Start: 2021-11-02

## 2021-11-02 RX ORDER — SODIUM CHLORIDE 0.9 % (FLUSH) 0.9 %
10 SYRINGE (ML) INJECTION EVERY 12 HOURS SCHEDULED
Status: CANCELLED | OUTPATIENT
Start: 2021-11-02

## 2021-11-02 NOTE — PROGRESS NOTES
"Chief Complaint: Hernia    Subjective         History of Present Illness  Lavell Pires is a 51 y.o. male presents to [facility] to be seen for evaluation for left inguinal hernia.  Patient reports he over the last 3 months has been feeling some bulging in his left groin.  He reports on occasion he felt a pop in that area.  He reports throughout the day he feels a bulging and sometimes feels almost a gurgling in his left groin.  He reports that the bulging gets better at night when he lays down.  He reports no overlying skin changes.  He does have occasional episodes of feeling bloated or his stomach feeling hard especially when he feels a gurgling in his left groin..    Objective     History reviewed. No pertinent past medical history.    Past Surgical History:   Procedure Laterality Date   • COLONOSCOPY     • VASECTOMY           Current Outpatient Medications:   •  carvedilol (COREG) 3.125 MG tablet, Take 3.125 mg by mouth 2 (Two) Times a Day., Disp: , Rfl:   •  montelukast (SINGULAIR) 10 MG tablet, Take 10 mg by mouth Every Night., Disp: , Rfl:   •  sildenafil (VIAGRA) 50 MG tablet, Take 50 mg by mouth Daily As Needed for Erectile Dysfunction., Disp: , Rfl:   •  vitamin D (ERGOCALCIFEROL) 1.25 MG (76373 UT) capsule capsule, Take 1 capsule by mouth Every 7 (Seven) Days., Disp: 12 capsule, Rfl: 1  •  omeprazole (priLOSEC) 40 MG capsule, Take 1 capsule by mouth Daily., Disp: 90 capsule, Rfl: 0    Allergies   Allergen Reactions   • Sulfamethoxazole-Trimethoprim Rash        History reviewed. No pertinent family history.    Social History     Socioeconomic History   • Marital status:    Tobacco Use   • Smoking status: Never Smoker   • Smokeless tobacco: Never Used   Vaping Use   • Vaping Use: Never used       Vital Signs:   Resp 16   Ht 175.3 cm (69\")   Wt 79.3 kg (174 lb 12.8 oz)   BMI 25.81 kg/m²      Physical Exam  Constitutional:       Appearance: Normal appearance. "   Cardiovascular:      Rate and Rhythm: Normal rate.   Pulmonary:      Effort: Pulmonary effort is normal.   Abdominal:      Palpations: Abdomen is soft.   Skin:     General: Skin is warm.     Reducible left inguinal hernia    Result Review :            Procedures        Assessment and Plan    Diagnoses and all orders for this visit:    1. Left inguinal hernia (Primary)        Follow Up   No follow-ups on file.  Patient was given instructions and counseling regarding his condition or for health maintenance advice. Please see specific information pulled into the AVS if appropriate.       Patient has clinical symptoms of a left inguinal hernia he had an ultrasound performed by his PCP which shows left inguinal hernia with a likely incarcerated fatty contents.    I discussed options for hernia repair with the patient including open laparoscopic and robotic repair.  Pros and cons of all the above were discussed extensively.  After discussion recommendations the patient has elected for robotic inguinal hernia repair with mesh.    Risk benefits alternatives of this procedure were discussed extensively.  All questions were answered.    Discussed with the patient - all questions were answered they voiced understanding and agreed to proceed with above plan

## 2021-11-04 RX ORDER — CARVEDILOL 3.12 MG/1
TABLET ORAL
Qty: 180 TABLET | Refills: 0 | Status: SHIPPED | OUTPATIENT
Start: 2021-11-04 | End: 2022-02-02

## 2021-12-02 NOTE — PRE-PROCEDURE INSTRUCTIONS
Pt instructed no vitamins, supplements, or NSAIDs for 1 week prior to procedure. Pt to take coreg AM of surgery. Instructed on surgical wash. Verbalized understanding.

## 2021-12-20 ENCOUNTER — HOSPITAL ENCOUNTER (OUTPATIENT)
Facility: HOSPITAL | Age: 51
Setting detail: HOSPITAL OUTPATIENT SURGERY
Discharge: HOME | End: 2021-12-20
Attending: SURGERY | Admitting: SURGERY

## 2021-12-20 ENCOUNTER — ANESTHESIA EVENT (OUTPATIENT)
Dept: PERIOP | Facility: HOSPITAL | Age: 51
End: 2021-12-20

## 2021-12-20 ENCOUNTER — ANESTHESIA (OUTPATIENT)
Dept: PERIOP | Facility: HOSPITAL | Age: 51
End: 2021-12-20

## 2021-12-20 VITALS
BODY MASS INDEX: 25.83 KG/M2 | HEIGHT: 69 IN | SYSTOLIC BLOOD PRESSURE: 122 MMHG | WEIGHT: 174.38 LBS | HEART RATE: 70 BPM | DIASTOLIC BLOOD PRESSURE: 74 MMHG | TEMPERATURE: 98 F | OXYGEN SATURATION: 98 % | RESPIRATION RATE: 12 BRPM

## 2021-12-20 DIAGNOSIS — K40.90 LEFT INGUINAL HERNIA: ICD-10-CM

## 2021-12-20 PROCEDURE — 93005 ELECTROCARDIOGRAM TRACING: CPT | Performed by: SURGERY

## 2021-12-20 PROCEDURE — S2900 ROBOTIC SURGICAL SYSTEM: HCPCS | Performed by: SURGERY

## 2021-12-20 PROCEDURE — 0 CEFAZOLIN IN DEXTROSE 2-4 GM/100ML-% SOLUTION: Performed by: SURGERY

## 2021-12-20 PROCEDURE — 49650 LAP ING HERNIA REPAIR INIT: CPT | Performed by: SURGERY

## 2021-12-20 PROCEDURE — 25010000002 ONDANSETRON PER 1 MG: Performed by: NURSE ANESTHETIST, CERTIFIED REGISTERED

## 2021-12-20 PROCEDURE — 0 LIDOCAINE 1 % SOLUTION 20 ML VIAL: Performed by: SURGERY

## 2021-12-20 PROCEDURE — C1781 MESH (IMPLANTABLE): HCPCS | Performed by: SURGERY

## 2021-12-20 PROCEDURE — 49650 LAP ING HERNIA REPAIR INIT: CPT | Performed by: SPECIALIST/TECHNOLOGIST, OTHER

## 2021-12-20 PROCEDURE — 25010000002 DEXAMETHASONE PER 1 MG: Performed by: NURSE ANESTHETIST, CERTIFIED REGISTERED

## 2021-12-20 PROCEDURE — 25010000002 HYDROMORPHONE 1 MG/ML SOLUTION: Performed by: NURSE ANESTHETIST, CERTIFIED REGISTERED

## 2021-12-20 PROCEDURE — C1889 IMPLANT/INSERT DEVICE, NOC: HCPCS | Performed by: SURGERY

## 2021-12-20 PROCEDURE — 25010000002 FENTANYL CITRATE (PF) 50 MCG/ML SOLUTION: Performed by: NURSE ANESTHETIST, CERTIFIED REGISTERED

## 2021-12-20 PROCEDURE — 25010000002 KETOROLAC TROMETHAMINE PER 15 MG: Performed by: NURSE ANESTHETIST, CERTIFIED REGISTERED

## 2021-12-20 PROCEDURE — 25010000002 MIDAZOLAM PER 1MG: Performed by: ANESTHESIOLOGY

## 2021-12-20 PROCEDURE — 25010000002 PROPOFOL 10 MG/ML EMULSION: Performed by: NURSE ANESTHETIST, CERTIFIED REGISTERED

## 2021-12-20 DEVICE — MESH PROGRIP LAP S/FIX ABS 10X15CM BX/2: Type: IMPLANTABLE DEVICE | Site: ABDOMEN | Status: FUNCTIONAL

## 2021-12-20 DEVICE — ABSORBABLE WOUND CLOSURE DEVICE
Type: IMPLANTABLE DEVICE | Site: ABDOMEN | Status: FUNCTIONAL
Brand: V-LOC 180

## 2021-12-20 RX ORDER — MEPERIDINE HYDROCHLORIDE 25 MG/ML
12.5 INJECTION INTRAMUSCULAR; INTRAVENOUS; SUBCUTANEOUS
Status: DISCONTINUED | OUTPATIENT
Start: 2021-12-20 | End: 2021-12-20 | Stop reason: HOSPADM

## 2021-12-20 RX ORDER — PROMETHAZINE HYDROCHLORIDE 25 MG/1
25 SUPPOSITORY RECTAL ONCE AS NEEDED
Status: DISCONTINUED | OUTPATIENT
Start: 2021-12-20 | End: 2021-12-20 | Stop reason: HOSPADM

## 2021-12-20 RX ORDER — DEXAMETHASONE SODIUM PHOSPHATE 4 MG/ML
INJECTION, SOLUTION INTRA-ARTICULAR; INTRALESIONAL; INTRAMUSCULAR; INTRAVENOUS; SOFT TISSUE AS NEEDED
Status: DISCONTINUED | OUTPATIENT
Start: 2021-12-20 | End: 2021-12-20 | Stop reason: SURG

## 2021-12-20 RX ORDER — OXYCODONE HYDROCHLORIDE 5 MG/1
5 TABLET ORAL
Status: DISCONTINUED | OUTPATIENT
Start: 2021-12-20 | End: 2021-12-20 | Stop reason: HOSPADM

## 2021-12-20 RX ORDER — SODIUM CHLORIDE 0.9 % (FLUSH) 0.9 %
10 SYRINGE (ML) INJECTION EVERY 12 HOURS SCHEDULED
Status: DISCONTINUED | OUTPATIENT
Start: 2021-12-20 | End: 2021-12-20 | Stop reason: HOSPADM

## 2021-12-20 RX ORDER — ONDANSETRON 2 MG/ML
4 INJECTION INTRAMUSCULAR; INTRAVENOUS ONCE AS NEEDED
Status: DISCONTINUED | OUTPATIENT
Start: 2021-12-20 | End: 2021-12-20 | Stop reason: HOSPADM

## 2021-12-20 RX ORDER — LIDOCAINE HYDROCHLORIDE 20 MG/ML
INJECTION, SOLUTION INFILTRATION; PERINEURAL AS NEEDED
Status: DISCONTINUED | OUTPATIENT
Start: 2021-12-20 | End: 2021-12-20 | Stop reason: SURG

## 2021-12-20 RX ORDER — FENTANYL CITRATE 50 UG/ML
INJECTION, SOLUTION INTRAMUSCULAR; INTRAVENOUS AS NEEDED
Status: DISCONTINUED | OUTPATIENT
Start: 2021-12-20 | End: 2021-12-20 | Stop reason: SURG

## 2021-12-20 RX ORDER — CEFAZOLIN SODIUM 2 G/100ML
2 INJECTION, SOLUTION INTRAVENOUS ONCE
Status: COMPLETED | OUTPATIENT
Start: 2021-12-20 | End: 2021-12-20

## 2021-12-20 RX ORDER — PHENYLEPHRINE HCL IN 0.9% NACL 1 MG/10 ML
SYRINGE (ML) INTRAVENOUS AS NEEDED
Status: DISCONTINUED | OUTPATIENT
Start: 2021-12-20 | End: 2021-12-20 | Stop reason: SURG

## 2021-12-20 RX ORDER — CARVEDILOL 3.12 MG/1
3.12 TABLET ORAL ONCE
Status: COMPLETED | OUTPATIENT
Start: 2021-12-20 | End: 2021-12-20

## 2021-12-20 RX ORDER — KETOROLAC TROMETHAMINE 30 MG/ML
INJECTION, SOLUTION INTRAMUSCULAR; INTRAVENOUS AS NEEDED
Status: DISCONTINUED | OUTPATIENT
Start: 2021-12-20 | End: 2021-12-20 | Stop reason: SURG

## 2021-12-20 RX ORDER — HYDROCODONE BITARTRATE AND ACETAMINOPHEN 5; 325 MG/1; MG/1
1-2 TABLET ORAL EVERY 4 HOURS PRN
Qty: 20 TABLET | Refills: 0 | Status: SHIPPED | OUTPATIENT
Start: 2021-12-20 | End: 2023-02-15

## 2021-12-20 RX ORDER — MIDAZOLAM HYDROCHLORIDE 2 MG/2ML
2 INJECTION, SOLUTION INTRAMUSCULAR; INTRAVENOUS ONCE
Status: COMPLETED | OUTPATIENT
Start: 2021-12-20 | End: 2021-12-20

## 2021-12-20 RX ORDER — PROMETHAZINE HYDROCHLORIDE 12.5 MG/1
25 TABLET ORAL ONCE AS NEEDED
Status: DISCONTINUED | OUTPATIENT
Start: 2021-12-20 | End: 2021-12-20 | Stop reason: HOSPADM

## 2021-12-20 RX ORDER — ROCURONIUM BROMIDE 10 MG/ML
INJECTION, SOLUTION INTRAVENOUS AS NEEDED
Status: DISCONTINUED | OUTPATIENT
Start: 2021-12-20 | End: 2021-12-20 | Stop reason: SURG

## 2021-12-20 RX ORDER — DOCUSATE SODIUM 100 MG/1
100 CAPSULE, LIQUID FILLED ORAL 2 TIMES DAILY PRN
Qty: 10 CAPSULE | Refills: 1 | Status: SHIPPED | OUTPATIENT
Start: 2021-12-20 | End: 2022-12-20

## 2021-12-20 RX ORDER — PROPOFOL 10 MG/ML
VIAL (ML) INTRAVENOUS AS NEEDED
Status: DISCONTINUED | OUTPATIENT
Start: 2021-12-20 | End: 2021-12-20 | Stop reason: SURG

## 2021-12-20 RX ORDER — ONDANSETRON 2 MG/ML
INJECTION INTRAMUSCULAR; INTRAVENOUS AS NEEDED
Status: DISCONTINUED | OUTPATIENT
Start: 2021-12-20 | End: 2021-12-20 | Stop reason: SURG

## 2021-12-20 RX ORDER — SODIUM CHLORIDE 0.9 % (FLUSH) 0.9 %
10 SYRINGE (ML) INJECTION AS NEEDED
Status: DISCONTINUED | OUTPATIENT
Start: 2021-12-20 | End: 2021-12-20 | Stop reason: HOSPADM

## 2021-12-20 RX ORDER — HYDROCODONE BITARTRATE AND ACETAMINOPHEN 5; 325 MG/1; MG/1
1 TABLET ORAL ONCE AS NEEDED
Status: DISCONTINUED | OUTPATIENT
Start: 2021-12-20 | End: 2021-12-20 | Stop reason: HOSPADM

## 2021-12-20 RX ORDER — ACETAMINOPHEN 500 MG
1000 TABLET ORAL ONCE
Status: COMPLETED | OUTPATIENT
Start: 2021-12-20 | End: 2021-12-20

## 2021-12-20 RX ORDER — SODIUM CHLORIDE, SODIUM LACTATE, POTASSIUM CHLORIDE, CALCIUM CHLORIDE 600; 310; 30; 20 MG/100ML; MG/100ML; MG/100ML; MG/100ML
9 INJECTION, SOLUTION INTRAVENOUS CONTINUOUS PRN
Status: DISCONTINUED | OUTPATIENT
Start: 2021-12-20 | End: 2021-12-20 | Stop reason: HOSPADM

## 2021-12-20 RX ADMIN — ONDANSETRON 4 MG: 2 INJECTION INTRAMUSCULAR; INTRAVENOUS at 13:00

## 2021-12-20 RX ADMIN — ACETAMINOPHEN 1000 MG: 500 TABLET ORAL at 11:58

## 2021-12-20 RX ADMIN — PROPOFOL 150 MG: 10 INJECTION, EMULSION INTRAVENOUS at 12:53

## 2021-12-20 RX ADMIN — DEXAMETHASONE SODIUM PHOSPHATE 4 MG: 4 INJECTION INTRA-ARTICULAR; INTRALESIONAL; INTRAMUSCULAR; INTRAVENOUS; SOFT TISSUE at 13:00

## 2021-12-20 RX ADMIN — FENTANYL CITRATE 100 MCG: 50 INJECTION, SOLUTION INTRAMUSCULAR; INTRAVENOUS at 12:53

## 2021-12-20 RX ADMIN — SODIUM CHLORIDE, POTASSIUM CHLORIDE, SODIUM LACTATE AND CALCIUM CHLORIDE: 600; 310; 30; 20 INJECTION, SOLUTION INTRAVENOUS at 13:12

## 2021-12-20 RX ADMIN — PROPOFOL 30 MG: 10 INJECTION, EMULSION INTRAVENOUS at 14:10

## 2021-12-20 RX ADMIN — HYDROMORPHONE HYDROCHLORIDE 0.25 MG: 1 INJECTION, SOLUTION INTRAMUSCULAR; INTRAVENOUS; SUBCUTANEOUS at 14:51

## 2021-12-20 RX ADMIN — Medication 200 MCG: at 13:58

## 2021-12-20 RX ADMIN — LIDOCAINE HYDROCHLORIDE 100 MG: 20 INJECTION, SOLUTION INFILTRATION; PERINEURAL at 12:53

## 2021-12-20 RX ADMIN — SODIUM CHLORIDE, POTASSIUM CHLORIDE, SODIUM LACTATE AND CALCIUM CHLORIDE 9 ML/HR: 600; 310; 30; 20 INJECTION, SOLUTION INTRAVENOUS at 12:13

## 2021-12-20 RX ADMIN — ROCURONIUM BROMIDE 50 MG: 10 INJECTION INTRAVENOUS at 12:53

## 2021-12-20 RX ADMIN — CEFAZOLIN SODIUM 2 G: 2 INJECTION, SOLUTION INTRAVENOUS at 12:50

## 2021-12-20 RX ADMIN — OXYCODONE HYDROCHLORIDE 5 MG: 5 TABLET ORAL at 14:51

## 2021-12-20 RX ADMIN — MIDAZOLAM HYDROCHLORIDE 2 MG: 1 INJECTION, SOLUTION INTRAMUSCULAR; INTRAVENOUS at 12:10

## 2021-12-20 RX ADMIN — SUGAMMADEX 200 MG: 100 INJECTION, SOLUTION INTRAVENOUS at 14:08

## 2021-12-20 RX ADMIN — KETOROLAC TROMETHAMINE 30 MG: 30 INJECTION, SOLUTION INTRAMUSCULAR at 13:00

## 2021-12-20 RX ADMIN — CARVEDILOL 3.12 MG: 3.12 TABLET, FILM COATED ORAL at 11:59

## 2021-12-20 RX ADMIN — ROCURONIUM BROMIDE 10 MG: 10 INJECTION INTRAVENOUS at 13:33

## 2021-12-20 NOTE — ANESTHESIA POSTPROCEDURE EVALUATION
Patient: Lavell Pires    Procedure Summary     Date: 12/20/21 Room / Location: [facility] OR 08 / [facility] MAIN OR    Anesthesia Start: 1250 Anesthesia Stop: 1426    Procedure: INGUINAL HERNIA REPAIR LAPAROSCOPIC WITH DAVINCI ROBOT, left (Left Abdomen) Diagnosis:       Left inguinal hernia      (Left inguinal hernia [K40.90])    Surgeons: Corona Kolb MD Provider: Tristan Lomeli MD    Anesthesia Type: general ASA Status: 2          Anesthesia Type: general    Vitals  Vitals Value Taken Time   /83 12/20/21 1430   Temp 36.6 °C (97.8 °F) 12/20/21 1425   Pulse 77 12/20/21 1432   Resp 19 12/20/21 1430   SpO2 97 % 12/20/21 1432   Vitals shown include unvalidated device data.        Post Anesthesia Care and Evaluation    Patient location during evaluation: bedside  Patient participation: complete - patient participated  Level of consciousness: awake, responsive to verbal stimuli, responsive to painful stimuli and responsive to noxious stimuli  Pain score: 2  Pain management: adequate  Airway patency: patent  Anesthetic complications: No anesthetic complications  PONV Status: none  Cardiovascular status: acceptable and stable  Respiratory status: acceptable and nasal cannula  Hydration status: acceptable    Comments: An Anesthesiologist personally participated in the most demanding procedures (including induction and emergence if applicable) in the anesthesia plan, monitored the course of anesthesia administration at frequent intervals and remained physically present and available for immediate diagnosis and treatment of emergencies.

## 2021-12-20 NOTE — DISCHARGE INSTRUCTIONS
DISCHARGE INSTRUCTIONS  HERNIA Lavell Pires     ? For your surgery you had:  ? General anesthesia (you may have a sore throat for the first 24 hours)  ? IV sedation.  ? Local anesthesia  ? Monitored anesthesia care  ? You received a medicated patch for nausea prevention today (behind your ear). It is recommended that you remove it 24-48 hours post-operatively. It must be removed within 72 hours.   ? You received an anesthesia medication today that can cause hormonal forms of birth control to be ineffective. You should use a different form of birth control (to prevent pregnancy) for 7 days.  ? You may experience dizziness, drowsiness, or light-headedness for several hours following surgery/procedure.  ? Do not stay alone today or tonight.  ? Limit your activity for 24 hours.  ? Resume your diet slowly.  Follow whatever special dietary instructions you may have been given by your doctor.  ? You should not drive, operate machinery, drink alcohol, or sign legally binding documents for 24 hours or while you are taking pain medication.    NOTIFY YOUR DOCTOR IF YOU EXPERIENCE ANY OF THE FOLLOWING:  ? Temperature greater than 101 degrees Fahrenheit  ? Shaking Chills  ? Redness or excessive drainage from incision  ? Nausea, vomiting and/or pain that is not controlled by prescribed medications  ? Increase in bleeding or bleeding that is excessive  ? Unable to urinate in 6 hours after surgery  ? If unable to reach your doctor, please go to the closest Emergency Room [x] You may remove dressing:   [] in 24 hours   [x] in 48 hours   [] Other:    [x] You may shower or bathe: tmrw  ? Apply an ice pack for 24-48 hours.  [x] Wear a jockey support or tight fitting briefs to prevent swelling.  ? Do not do any heavy lifting, pushing or pulling.  ? You may walk up and down stairs.  ? You may ride in a car but do not drive until instructed by your physician.  ? Avoid constipation.  ? If unable to urinate in 6 to 8 hours after surgery  or urinating frequently in small amounts, notify your doctor or go to the nearest Emergency Room.  ? Medications per physician instructions as indicated on Discharge Medication Information Sheet.    Last dose of pain medication was given at 3 pm

## 2021-12-20 NOTE — OP NOTE
Preoperative diagnosis: Left inguinal hernia    Postoperative diagnosis: Same    Procedure: Robotic left inguinal hernia repair    Surgeon: Cortes    Anesthesia: General    Assistant: Martha Zelaya    EBL: 7    Specimens: None    Complications: None    Indications: 51-year-old male with an increasingly enlarging and symptomatic inguinal hernia.  Presents today for elective repair.    PROCEDURE    Patient was seen in the preoperative holding area.  Risks, benefits, alternatives of the operation were again discussed in detail.  All of the patient and family's questions were answered.  They voiced understanding, and agreed to proceed.    Patient was brought to the operating room.  Monitoring devices and Moris stockings were placed.  Anesthesia was administered.  Patient was prepped and draped in standard surgical fashion.  After prepping and draping a timeout was performed to verify both the correct patient and correct procedure.    We began by injecting local anesthesia in the left upper quadrant.  An incision to accommodate a 12 mm trocar was made in the left upper quadrant, and the Veress needle was inserted into the abdomen.  Intra-abdominal placement was verified with a normal saline drop test.  After verification, the abdomen was insufflated to 15 mmHg pressure.  Once the abdomen was insufflated, we removed the Veress needle.  Then using the Visiport technique the abdomen was entered in the left upper quadrant.  Once the abdomen was entered, we reinserted the laparoscope and looked underneath our Veress needle and Visiport entry sites, making sure there was no underlying injury.  We then went about placing our subsequent trochars.  After injection of local anesthesia and under direct visualization, an 8 mm trocar was placed in the left and right mid abdomen and just above the umbilicus.    We then brought in and docked the robot.  Once the robot was in appropriate position, we identified the anterior superior  iliac spine and made an incision into the peritoneum at this level.  We sharply entered the preperitoneal space.  We allowed CO2 insufflation to aid in our dissection.  We dissected into the preperitoneal space with a combination of blunt dissection and electrocautery.  I dissected medially until I found the pubic tubercle.  We dissected about 2 cm below the level the pubic tubercle.  We then dissected medially to laterally.  During this dissection we reduced any contents in both the direct and indirect spaces.  Once the hernia sac was completely reduced and all contents were free of the direct and indirect space we finished our dissection out laterally to make space for the mesh.  We inspected our operative field and made sure breathing appeared to be hemostatic.  We did not appear to have any underlying injury.  Placed a 10 x 15 piece of ProGrip mesh into the preperitoneal space.  We made sure it covered past the midline and down below the pubic tubercle and our previous dissection field, we also made sure that it covered both the direct and indirect spaces completely with all reduced contents outside of the mesh.  Once we felt that the mesh was in appropriate position we closed the peritoneum over top of the mesh with a running 3 0 V-Loc suture.  We again inspected our operative field and made sure everything appeared to be hemostatic with no obvious underlying injury.  We also inspected the right inguinal area with no obvious sign of a right inguinal hernia.    We then removed the 12 mm trocar, and that site was closed with a Brendan Guadalupe device and 0 Vicryl suture under direct visualization.  The remaining trochars were then removed under direct visualization, and the abdomen was desufflated.  Skin incisions were closed with subcuticular 4-0 Vicryl stitch and dressed with skin glue.    The patient was then aroused anesthesia, and taken off the OR table, and taken to PACU in stable condition.  Sponge, needle,  and instrument counts were correct x2.  Martha Zelaya was present for the entire the procedure and helped in all portions of the case.    The operative note was dictated with the help of the dragon dictation system.

## 2021-12-20 NOTE — ANESTHESIA PREPROCEDURE EVALUATION
Anesthesia Evaluation     Patient summary reviewed and Nursing notes reviewed                Airway   Mallampati: I  TM distance: >3 FB  Neck ROM: full  No difficulty expected  Dental      Pulmonary - negative pulmonary ROS and normal exam    breath sounds clear to auscultation  Cardiovascular - normal exam    Rhythm: regular    (+) hypertension,       Neuro/Psych  (+) dizziness/light headedness,     GI/Hepatic/Renal/Endo - negative ROS     Musculoskeletal (-) negative ROS    Abdominal    Substance History - negative use     OB/GYN negative ob/gyn ROS         Other                        Anesthesia Plan    ASA 2     general     intravenous induction     Anesthetic plan, all risks, benefits, and alternatives have been provided, discussed and informed consent has been obtained with: patient.

## 2021-12-20 NOTE — H&P
"History of Present Illness  Lavell Pires is a 51 y.o. male presents to [facility] to be seen for evaluation for left inguinal hernia.  Patient reports he over the last 3 months has been feeling some bulging in his left groin.  He reports on occasion he felt a pop in that area.  He reports throughout the day he feels a bulging and sometimes feels almost a gurgling in his left groin.  He reports that the bulging gets better at night when he lays down.  He reports no overlying skin changes.  He does have occasional episodes of feeling bloated or his stomach feeling hard especially when he feels a gurgling in his left groin..           Objective         History reviewed. No pertinent past medical history.           Past Surgical History:   Procedure Laterality Date   • COLONOSCOPY       • VASECTOMY                Current Outpatient Medications:   •  carvedilol (COREG) 3.125 MG tablet, Take 3.125 mg by mouth 2 (Two) Times a Day., Disp: , Rfl:   •  montelukast (SINGULAIR) 10 MG tablet, Take 10 mg by mouth Every Night., Disp: , Rfl:   •  sildenafil (VIAGRA) 50 MG tablet, Take 50 mg by mouth Daily As Needed for Erectile Dysfunction., Disp: , Rfl:   •  vitamin D (ERGOCALCIFEROL) 1.25 MG (14552 UT) capsule capsule, Take 1 capsule by mouth Every 7 (Seven) Days., Disp: 12 capsule, Rfl: 1  •  omeprazole (priLOSEC) 40 MG capsule, Take 1 capsule by mouth Daily., Disp: 90 capsule, Rfl: 0          Allergies   Allergen Reactions   • Sulfamethoxazole-Trimethoprim Rash         History reviewed. No pertinent family history.     Social History           Socioeconomic History   • Marital status:    Tobacco Use   • Smoking status: Never Smoker   • Smokeless tobacco: Never Used   Vaping Use   • Vaping Use: Never used         Vital Signs:   Resp 16   Ht 175.3 cm (69\")   Wt 79.3 kg (174 lb 12.8 oz)   BMI 25.81 kg/m²      Physical Exam  Constitutional:       Appearance: Normal appearance. "   Cardiovascular:      Rate and Rhythm: Normal rate.   Pulmonary:      Effort: Pulmonary effort is normal.   Abdominal:      Palpations: Abdomen is soft.   Skin:     General: Skin is warm.    Reducible left inguinal hernia           Result Review    :               Procedures            Assessment      Assessment and Plan    Diagnoses and all orders for this visit:     1. Left inguinal hernia (Primary)           Follow Up   No follow-ups on file.  Patient was given instructions and counseling regarding his condition or for health maintenance advice. Please see specific information pulled into the AVS if appropriate.         Patient has clinical symptoms of a left inguinal hernia he had an ultrasound performed by his PCP which shows left inguinal hernia with a likely incarcerated fatty contents.     I discussed options for hernia repair with the patient including open laparoscopic and robotic repair.  Pros and cons of all the above were discussed extensively.  After discussion recommendations the patient has elected for robotic inguinal hernia repair with mesh.     Risk benefits alternatives of this procedure were discussed extensively.  All questions were answered.     Discussed with the patient - all questions were answered they voiced understanding and agreed to proceed with above plan

## 2021-12-29 ENCOUNTER — TELEPHONE (OUTPATIENT)
Dept: FAMILY MEDICINE CLINIC | Age: 51
End: 2021-12-29

## 2021-12-29 NOTE — TELEPHONE ENCOUNTER
Caller: Lavell Pires    Relationship to patient: Self    Best call back number: [phone]    Date of exposure: 12/26/2021 SON'S GIRL FRIEND WAS IN THE HOME    Date of positive COVID19 test: SON'S GIRLFRIEND TESTED POSITIVE ON 12/27/2021     Date if possible COVID19 exposure: 12/26/2021    COVID19 symptoms: SCRATCHY AND SORE THROAT     Date of initial quarantine: 12/27/2021    Additional information or concerns: PATIENT HAD HERNIA SURGERY ON 12/20/201 AND HAS NOT LEFT HIS HOME. PATIENT WAS CALLING IN TO INQUIRE IF HE NEEDS A COVID TEST OR NOT.  ALSO, WHAT IS THE PROCEDURE OR POLICY HE SHOULD FOLLOW. STATES THAT HE WOULD LIKE TO RETURN BACK TO WORK ON January 3 IF POSSIBLE    What is the patients preferred pharmacy:    [id number] - [place], [place] - [address] AT [facility] [address] &  - [phone]  - [phone] FX

## 2021-12-30 ENCOUNTER — CLINICAL SUPPORT (OUTPATIENT)
Dept: FAMILY MEDICINE CLINIC | Age: 51
End: 2021-12-30

## 2021-12-30 DIAGNOSIS — J02.9 SORE THROAT: Primary | ICD-10-CM

## 2021-12-30 LAB
EXPIRATION DATE: NORMAL
INTERNAL CONTROL: NORMAL
Lab: NORMAL
SARS-COV-2 AG UPPER RESP QL IA.RAPID: NOT DETECTED

## 2021-12-30 PROCEDURE — 87426 SARSCOV CORONAVIRUS AG IA: CPT | Performed by: NURSE PRACTITIONER

## 2021-12-30 PROCEDURE — 99211 OFF/OP EST MAY X REQ PHY/QHP: CPT | Performed by: NURSE PRACTITIONER

## 2021-12-30 NOTE — TELEPHONE ENCOUNTER
He can come in for testing. New guidelines say that if you were exposed to someone with covid 19 and had your vaccine over 6 months ago as he has, you should stay home for 5 days and continue to wear a mask around others for 5 additional days and test on day 5 if possible. If tests positive, should stay home for 5 days and can leave house if symptoms are resolving for 5 additional days and wear a mask for 5 additional days.

## 2022-01-04 ENCOUNTER — OFFICE VISIT (OUTPATIENT)
Dept: SURGERY | Facility: CLINIC | Age: 52
End: 2022-01-04

## 2022-01-04 VITALS — WEIGHT: 175 LBS | BODY MASS INDEX: 25.92 KG/M2 | HEIGHT: 69 IN

## 2022-01-04 DIAGNOSIS — K40.90 LEFT INGUINAL HERNIA: Primary | ICD-10-CM

## 2022-01-04 PROCEDURE — 99024 POSTOP FOLLOW-UP VISIT: CPT | Performed by: SURGERY

## 2022-01-04 NOTE — PROGRESS NOTES
"Chief Complaint: Post-op    Subjective         History of Present Illness  Lavell Pires is a 51 y.o. male presents to [facility] GENERAL SURGERY. The patient is to be seen for robotic inguinal hernia repair.     The patient has consented to being recorded using NEVAE[employer].    He reports feeling that he was doing well; however, he \"came down with a touch of bronchitis\" and has experienced a significant amount of coughing, as well as sneezing. He adds that he feels his soreness is secondary to the aforementioned symptoms. The patient adds he was tested for COVID-19 and this was negative. He denies soreness to his groin area; however, he does note of tenderness to the left upper quadrant periumbilical area in the last 2 days.  He states he has been \"laying low\" as best he is able. The patient inquires if hernias are hereditary as his father had multiple hernias.     Objective     Past Medical History:   Diagnosis Date   • Inguinal hernia    • Tachycardia     only sees PCP, controlled for past 15 years       Past Surgical History:   Procedure Laterality Date   • COLONOSCOPY     • INGUINAL HERNIA REPAIR Left 12/20/2021    Procedure: INGUINAL HERNIA REPAIR LAPAROSCOPIC WITH DAVINCI ROBOT, left;  Surgeon: Corona Kolb MD;  Location: [facility];  Service: Robotics - DaVinci;  Laterality: Left;   • VASECTOMY           Current Outpatient Medications:   •  carvedilol (COREG) 3.125 MG tablet, TAKE 1 TABLET BY MOUTH TWICE DAILY (Patient taking differently: Take 3.125 mg by mouth 2 (Two) Times a Day With Meals.), Disp: 180 tablet, Rfl: 0  •  montelukast (SINGULAIR) 10 MG tablet, Take 10 mg by mouth Every Night., Disp: , Rfl:   •  vitamin D (ERGOCALCIFEROL) 1.25 MG (47916 UT) capsule capsule, Take 1 capsule by mouth Every 7 (Seven) Days. (Patient taking differently: Take 50,000 Units by mouth Every 7 (Seven) Days. Takes on Fridays), Disp: 12 capsule, Rfl: 1  •  docusate sodium (Colace) 100 MG capsule, Take " 1 capsule by mouth 2 (Two) Times a Day As Needed for Constipation., Disp: 10 capsule, Rfl: 1  •  HYDROcodone-acetaminophen (NORCO) 5-325 MG per tablet, Take 1-2 tablets by mouth Every 4 (Four) Hours As Needed (Pain)., Disp: 20 tablet, Rfl: 0    Allergies   Allergen Reactions   • Sulfamethoxazole-Trimethoprim Rash and Other (See Comments)     Elevated liver enzymes        History reviewed. No pertinent family history.    Social History     Socioeconomic History   • Marital status:    Tobacco Use   • Smoking status: Never Smoker   • Smokeless tobacco: Never Used   Vaping Use   • Vaping Use: Never used   Substance and Sexual Activity   • Alcohol use: Never   • Drug use: Never   • Sexual activity: Defer        Physical Exam  Constitutional:       General: He is not in acute distress.     Appearance: Normal appearance. He is well-developed and normal weight.   Pulmonary:      Effort: Pulmonary effort is normal.      Breath sounds: Normal air entry.   Abdominal:      General: There is no distension.      Palpations: Abdomen is soft.      Tenderness: There is no abdominal tenderness.        Post Surgical Incision  Surgical wound: No acute distress. His incisions are healing well. There is no signs of recurrence even with valsalva.     Result Review :               Assessment and Plan    There are no diagnoses linked to this encounter.     He will follow-up as discussed today.       Follow Up   No follow-ups on file.  Patient was given instructions and counseling regarding his condition or for health maintenance advice. Please see specific information pulled into the AVS if appropriate.       Transcribed from ambient dictation for Corona Kolb MD by Sharmin Alicia.  01/04/22   16:56 EST    Patient verbalized consent to the visit recording.  I have personally performed the services described in this document as transcribed by the above individual, and it is both accurate and complete.  Corona Kolb MD   1/4/2022  18:11 EST

## 2022-01-10 RX ORDER — ERGOCALCIFEROL 1.25 MG/1
50000 CAPSULE ORAL
Qty: 12 CAPSULE | Refills: 1 | Status: SHIPPED | OUTPATIENT
Start: 2022-01-10 | End: 2023-02-15 | Stop reason: SDUPTHER

## 2022-01-11 ENCOUNTER — TELEPHONE (OUTPATIENT)
Dept: FAMILY MEDICINE CLINIC | Age: 52
End: 2022-01-11

## 2022-01-11 DIAGNOSIS — R10.33 PERIUMBILICAL ABDOMINAL PAIN: ICD-10-CM

## 2022-01-11 NOTE — TELEPHONE ENCOUNTER
Caller: Lavell Pires    Relationship: Self    Best call back number: [phone]    What medication are you requesting: BREO    What are your current symptoms: NAGGING, DRY COUGH, TESTED NEGATIVE 2 WEEKS AGO      How long have you been experiencing symptoms: 3 WEEKS    Have you had these symptoms before:    [x] Yes  [] No    Have you been treated for these symptoms before:   [x] Yes  [] No    If a prescription is needed, what is your preferred pharmacy and phone number: [id number] - [place], [place] - [address] AT [facility] [address]  - [phone]  - [phone] FX     Additional notes:

## 2022-01-12 RX ORDER — OMEPRAZOLE 40 MG/1
40 CAPSULE, DELAYED RELEASE ORAL DAILY
Qty: 90 CAPSULE | Refills: 0 | Status: SHIPPED | OUTPATIENT
Start: 2022-01-12 | End: 2023-02-15

## 2022-02-02 RX ORDER — CARVEDILOL 3.12 MG/1
TABLET ORAL
Qty: 180 TABLET | Refills: 0 | Status: SHIPPED | OUTPATIENT
Start: 2022-02-02 | End: 2022-05-26

## 2022-02-03 LAB — QT INTERVAL: 406 MS

## 2022-02-10 ENCOUNTER — TELEPHONE (OUTPATIENT)
Dept: SURGERY | Facility: CLINIC | Age: 52
End: 2022-02-10

## 2022-02-10 NOTE — TELEPHONE ENCOUNTER
Provider: DR PENDLETON  Caller: PAULINA NAQVI  Relationship to Patient: SELF    Phone Number: [phone]    Reason for Call: PT IS CALLING TO ASK ABOUT SOME SORENESS HE IS STILL HAVING AFTER PROCEDURE W/ DR BALL ON 12/20.     HE SAYS THAT AFTER EXERCISE OR AFTER SITTING FOR A LONGER PERIOD OF TIME AT WORK, HE IS EXPERIENCING SORENESS IN THE AREA OF THE HERNIA. HE STATES THAT IF HE EXERCISES IN THE EVENING THE SORENESS LASTS UNTIL HE GOES TO BED AND SOMETIMES TO THE NEXT MORNING. OTHER TIMES, IT LASTS ABOUT AN HOUR OR TWO.      PT CAN BE REACHED ANYTIME; OKAY TO LEAVE MESSAGE IF NO ANSWER

## 2022-05-26 RX ORDER — CARVEDILOL 3.12 MG/1
TABLET ORAL
Qty: 180 TABLET | Refills: 0 | Status: SHIPPED | OUTPATIENT
Start: 2022-05-26 | End: 2023-02-15 | Stop reason: SDUPTHER

## 2023-02-15 ENCOUNTER — OFFICE VISIT (OUTPATIENT)
Dept: INTERNAL MEDICINE | Facility: CLINIC | Age: 53
End: 2023-02-15
Payer: COMMERCIAL

## 2023-02-15 VITALS
OXYGEN SATURATION: 99 % | SYSTOLIC BLOOD PRESSURE: 126 MMHG | WEIGHT: 187.38 LBS | BODY MASS INDEX: 27.75 KG/M2 | HEIGHT: 69 IN | HEART RATE: 73 BPM | TEMPERATURE: 97.9 F | DIASTOLIC BLOOD PRESSURE: 74 MMHG

## 2023-02-15 DIAGNOSIS — E55.9 VITAMIN D DEFICIENCY, UNSPECIFIED: ICD-10-CM

## 2023-02-15 DIAGNOSIS — Z00.00 ANNUAL PHYSICAL EXAM: Primary | ICD-10-CM

## 2023-02-15 DIAGNOSIS — Z11.59 NEED FOR HEPATITIS C SCREENING TEST: ICD-10-CM

## 2023-02-15 LAB
25(OH)D3 SERPL-MCNC: 83.7 NG/ML (ref 30–100)
BASOPHILS # BLD AUTO: 0.02 10*3/MM3 (ref 0–0.2)
BASOPHILS NFR BLD AUTO: 0.3 % (ref 0–1.5)
DEPRECATED RDW RBC AUTO: 41.2 FL (ref 37–54)
EOSINOPHIL # BLD AUTO: 0.08 10*3/MM3 (ref 0–0.4)
EOSINOPHIL NFR BLD AUTO: 1.1 % (ref 0.3–6.2)
ERYTHROCYTE [DISTWIDTH] IN BLOOD BY AUTOMATED COUNT: 12 % (ref 12.3–15.4)
HCT VFR BLD AUTO: 48.5 % (ref 37.5–51)
HCV AB SER DONR QL: NORMAL
HGB BLD-MCNC: 16.2 G/DL (ref 13–17.7)
IMM GRANULOCYTES # BLD AUTO: 0.03 10*3/MM3 (ref 0–0.05)
IMM GRANULOCYTES NFR BLD AUTO: 0.4 % (ref 0–0.5)
LYMPHOCYTES # BLD AUTO: 2.35 10*3/MM3 (ref 0.7–3.1)
LYMPHOCYTES NFR BLD AUTO: 33.7 % (ref 19.6–45.3)
MCH RBC QN AUTO: 31.2 PG (ref 26.6–33)
MCHC RBC AUTO-ENTMCNC: 33.4 G/DL (ref 31.5–35.7)
MCV RBC AUTO: 93.3 FL (ref 79–97)
MONOCYTES # BLD AUTO: 0.67 10*3/MM3 (ref 0.1–0.9)
MONOCYTES NFR BLD AUTO: 9.6 % (ref 5–12)
NEUTROPHILS NFR BLD AUTO: 3.83 10*3/MM3 (ref 1.7–7)
NEUTROPHILS NFR BLD AUTO: 54.9 % (ref 42.7–76)
NRBC BLD AUTO-RTO: 0 /100 WBC (ref 0–0.2)
PLATELET # BLD AUTO: 269 10*3/MM3 (ref 140–450)
PMV BLD AUTO: 11.6 FL (ref 6–12)
RBC # BLD AUTO: 5.2 10*6/MM3 (ref 4.14–5.8)
WBC NRBC COR # BLD: 6.98 10*3/MM3 (ref 3.4–10.8)

## 2023-02-15 PROCEDURE — 80050 GENERAL HEALTH PANEL: CPT | Performed by: INTERNAL MEDICINE

## 2023-02-15 PROCEDURE — 99386 PREV VISIT NEW AGE 40-64: CPT | Performed by: INTERNAL MEDICINE

## 2023-02-15 PROCEDURE — 86803 HEPATITIS C AB TEST: CPT | Performed by: INTERNAL MEDICINE

## 2023-02-15 PROCEDURE — 80061 LIPID PANEL: CPT | Performed by: INTERNAL MEDICINE

## 2023-02-15 PROCEDURE — 82306 VITAMIN D 25 HYDROXY: CPT | Performed by: INTERNAL MEDICINE

## 2023-02-15 RX ORDER — ERGOCALCIFEROL 1.25 MG/1
50000 CAPSULE ORAL
Qty: 12 CAPSULE | Refills: 1 | Status: SHIPPED | OUTPATIENT
Start: 2023-02-15

## 2023-02-15 RX ORDER — SILDENAFIL 50 MG/1
50 TABLET, FILM COATED ORAL DAILY PRN
Qty: 10 TABLET | Refills: 0 | Status: SHIPPED | OUTPATIENT
Start: 2023-02-15

## 2023-02-15 RX ORDER — CARVEDILOL 3.12 MG/1
3.12 TABLET ORAL 2 TIMES DAILY
Qty: 180 TABLET | Refills: 3 | Status: SHIPPED | OUTPATIENT
Start: 2023-02-15

## 2023-02-16 LAB
ALBUMIN SERPL-MCNC: 4.9 G/DL (ref 3.5–5.2)
ALBUMIN/GLOB SERPL: 2.1 G/DL
ALP SERPL-CCNC: 65 U/L (ref 39–117)
ALT SERPL W P-5'-P-CCNC: 19 U/L (ref 1–41)
ANION GAP SERPL CALCULATED.3IONS-SCNC: 6.4 MMOL/L (ref 5–15)
AST SERPL-CCNC: 25 U/L (ref 1–40)
BILIRUB SERPL-MCNC: 1 MG/DL (ref 0–1.2)
BUN SERPL-MCNC: 18 MG/DL (ref 6–20)
BUN/CREAT SERPL: 15.4 (ref 7–25)
CALCIUM SPEC-SCNC: 9.8 MG/DL (ref 8.6–10.5)
CHLORIDE SERPL-SCNC: 101 MMOL/L (ref 98–107)
CHOLEST SERPL-MCNC: 198 MG/DL (ref 0–200)
CO2 SERPL-SCNC: 29.6 MMOL/L (ref 22–29)
CREAT SERPL-MCNC: 1.17 MG/DL (ref 0.76–1.27)
EGFRCR SERPLBLD CKD-EPI 2021: 75.5 ML/MIN/1.73
GLOBULIN UR ELPH-MCNC: 2.3 GM/DL
GLUCOSE SERPL-MCNC: 85 MG/DL (ref 65–99)
HDLC SERPL-MCNC: 44 MG/DL (ref 40–60)
LDLC SERPL CALC-MCNC: 127 MG/DL (ref 0–100)
LDLC/HDLC SERPL: 2.82 {RATIO}
POTASSIUM SERPL-SCNC: 4.3 MMOL/L (ref 3.5–5.2)
PROT SERPL-MCNC: 7.2 G/DL (ref 6–8.5)
SODIUM SERPL-SCNC: 137 MMOL/L (ref 136–145)
TRIGL SERPL-MCNC: 150 MG/DL (ref 0–150)
TSH SERPL DL<=0.05 MIU/L-ACNC: 0.63 UIU/ML (ref 0.27–4.2)
VLDLC SERPL-MCNC: 27 MG/DL (ref 5–40)

## 2023-03-15 PROBLEM — Z00.00 ANNUAL PHYSICAL EXAM: Status: ACTIVE | Noted: 2023-03-15

## 2023-08-24 RX ORDER — ERGOCALCIFEROL 1.25 MG/1
50000 CAPSULE ORAL
Qty: 12 CAPSULE | Refills: 1 | Status: SHIPPED | OUTPATIENT
Start: 2023-08-24

## 2023-12-18 ENCOUNTER — TELEPHONE (OUTPATIENT)
Dept: INTERNAL MEDICINE | Facility: CLINIC | Age: 53
End: 2023-12-18
Payer: COMMERCIAL

## 2023-12-18 ENCOUNTER — TELEPHONE (OUTPATIENT)
Dept: FAMILY MEDICINE CLINIC | Age: 53
End: 2023-12-18
Payer: COMMERCIAL

## 2023-12-18 RX ORDER — MONTELUKAST SODIUM 10 MG/1
10 TABLET ORAL NIGHTLY
Qty: 90 TABLET | Refills: 3 | Status: SHIPPED | OUTPATIENT
Start: 2023-12-18

## 2023-12-18 NOTE — TELEPHONE ENCOUNTER
Caller: PranayLavell    Relationship: Self    Best call back number: [phone]    Requested Prescriptions:   Requested Prescriptions     Pending Prescriptions Disp Refills    montelukast (SINGULAIR) 10 MG tablet       Sig: Take 1 tablet by mouth Every Night.        Pharmacy where request should be sent: [facility]     Last office visit with prescribing clinician: Visit date not found   Last telemedicine visit with prescribing clinician: Visit date not found   Next office visit with prescribing clinician: Visit date not found     Does the patient have less than a 3 day supply:  [x] Yes  [] No      Mike Thornton Rep   12/18/23 12:06 EST

## 2023-12-18 NOTE — TELEPHONE ENCOUNTER
Called and informed pt that medication has been sent to the pharmacy and he can pick it up whenever it is ready.

## 2024-01-29 RX ORDER — ERGOCALCIFEROL 1.25 MG/1
50000 CAPSULE ORAL
Qty: 12 CAPSULE | Refills: 1 | Status: SHIPPED | OUTPATIENT
Start: 2024-01-29

## 2024-01-29 RX ORDER — CARVEDILOL 3.12 MG/1
3.12 TABLET ORAL 2 TIMES DAILY
Qty: 180 TABLET | Refills: 3 | Status: SHIPPED | OUTPATIENT
Start: 2024-01-29

## 2024-01-29 NOTE — TELEPHONE ENCOUNTER
Beta-Blockers Protocol [id number]/29/2024 08:42 AM   Protocol Details Recent or future visit with authorizing provider

## 2024-03-04 ENCOUNTER — TELEPHONE (OUTPATIENT)
Dept: INTERNAL MEDICINE | Facility: CLINIC | Age: 54
End: 2024-03-04
Payer: COMMERCIAL

## 2024-03-04 DIAGNOSIS — Z00.00 ANNUAL PHYSICAL EXAM: Primary | ICD-10-CM

## 2024-03-04 DIAGNOSIS — E55.9 VITAMIN D DEFICIENCY, UNSPECIFIED: ICD-10-CM

## 2024-03-04 NOTE — TELEPHONE ENCOUNTER
Caller: Lavell Pires    Relationship: Self    Best call back number: [phone]     What orders are you requesting (i.e. lab or imaging): LAB ORDERS    In what timeframe would the patient need to come in: BEFORE 3.11.24    Where will you receive your lab/imaging services:     Additional notes: PATIENT WOULD LIKE BASIC LABS PUT IN FOR HIM BEFORE HIS APPOINTMENT SO HE CAN GET THOSE DONE BEFORE HIS APPOINTMENT ON 3.14.24. PATIENT WOULD LIKE HIS VITAMIN D CHECKED AND ANY HEART RELATED LABS LIKE CHOLESTEROL..ETC.      PATIENT WOULD LIKE A CALL/TEXT WHEN ORDERS ARE PUT IN SO HE CAN GET THOSE COMPLETED IN TIME FOR THE RESULTS TO BE BACK BEFORE HIS APPOINTMENT ON 3.14.24

## 2024-03-07 ENCOUNTER — LAB (OUTPATIENT)
Dept: LAB | Facility: HOSPITAL | Age: 54
End: 2024-03-07
Payer: COMMERCIAL

## 2024-03-07 DIAGNOSIS — E55.9 VITAMIN D DEFICIENCY, UNSPECIFIED: ICD-10-CM

## 2024-03-07 DIAGNOSIS — Z00.00 ANNUAL PHYSICAL EXAM: ICD-10-CM

## 2024-03-07 LAB
25(OH)D3 SERPL-MCNC: 73.7 NG/ML (ref 30–100)
ALBUMIN SERPL-MCNC: 4.4 G/DL (ref 3.5–5.2)
ALBUMIN/GLOB SERPL: 1.8 G/DL
ALP SERPL-CCNC: 56 U/L (ref 39–117)
ALT SERPL W P-5'-P-CCNC: 12 U/L (ref 1–41)
ANION GAP SERPL CALCULATED.3IONS-SCNC: 10 MMOL/L (ref 5–15)
AST SERPL-CCNC: 16 U/L (ref 1–40)
BASOPHILS # BLD AUTO: 0.01 10*3/MM3 (ref 0–0.2)
BASOPHILS NFR BLD AUTO: 0.1 % (ref 0–1.5)
BILIRUB SERPL-MCNC: 0.6 MG/DL (ref 0–1.2)
BUN SERPL-MCNC: 16 MG/DL (ref 6–20)
BUN/CREAT SERPL: 13.2 (ref 7–25)
CALCIUM SPEC-SCNC: 9 MG/DL (ref 8.6–10.5)
CHLORIDE SERPL-SCNC: 104 MMOL/L (ref 98–107)
CHOLEST SERPL-MCNC: 189 MG/DL (ref 0–200)
CO2 SERPL-SCNC: 26 MMOL/L (ref 22–29)
CREAT SERPL-MCNC: 1.21 MG/DL (ref 0.76–1.27)
DEPRECATED RDW RBC AUTO: 41.8 FL (ref 37–54)
EGFRCR SERPLBLD CKD-EPI 2021: 72 ML/MIN/1.73
EOSINOPHIL # BLD AUTO: 0.09 10*3/MM3 (ref 0–0.4)
EOSINOPHIL NFR BLD AUTO: 1.3 % (ref 0.3–6.2)
ERYTHROCYTE [DISTWIDTH] IN BLOOD BY AUTOMATED COUNT: 12 % (ref 12.3–15.4)
GLOBULIN UR ELPH-MCNC: 2.4 GM/DL
GLUCOSE SERPL-MCNC: 93 MG/DL (ref 65–99)
HCT VFR BLD AUTO: 46.4 % (ref 37.5–51)
HDLC SERPL-MCNC: 41 MG/DL (ref 40–60)
HGB BLD-MCNC: 15.8 G/DL (ref 13–17.7)
IMM GRANULOCYTES # BLD AUTO: 0.01 10*3/MM3 (ref 0–0.05)
IMM GRANULOCYTES NFR BLD AUTO: 0.1 % (ref 0–0.5)
LDLC SERPL CALC-MCNC: 129 MG/DL (ref 0–100)
LDLC/HDLC SERPL: 3.11 {RATIO}
LYMPHOCYTES # BLD AUTO: 2.43 10*3/MM3 (ref 0.7–3.1)
LYMPHOCYTES NFR BLD AUTO: 35.8 % (ref 19.6–45.3)
MCH RBC QN AUTO: 31.8 PG (ref 26.6–33)
MCHC RBC AUTO-ENTMCNC: 34.1 G/DL (ref 31.5–35.7)
MCV RBC AUTO: 93.4 FL (ref 79–97)
MONOCYTES # BLD AUTO: 0.64 10*3/MM3 (ref 0.1–0.9)
MONOCYTES NFR BLD AUTO: 9.4 % (ref 5–12)
NEUTROPHILS NFR BLD AUTO: 3.61 10*3/MM3 (ref 1.7–7)
NEUTROPHILS NFR BLD AUTO: 53.3 % (ref 42.7–76)
PLATELET # BLD AUTO: 233 10*3/MM3 (ref 140–450)
PMV BLD AUTO: 10.8 FL (ref 6–12)
POTASSIUM SERPL-SCNC: 4.3 MMOL/L (ref 3.5–5.2)
PROT SERPL-MCNC: 6.8 G/DL (ref 6–8.5)
RBC # BLD AUTO: 4.97 10*6/MM3 (ref 4.14–5.8)
SODIUM SERPL-SCNC: 140 MMOL/L (ref 136–145)
TRIGL SERPL-MCNC: 103 MG/DL (ref 0–150)
TSH SERPL DL<=0.05 MIU/L-ACNC: 1.17 UIU/ML (ref 0.27–4.2)
VLDLC SERPL-MCNC: 19 MG/DL (ref 5–40)
WBC NRBC COR # BLD AUTO: 6.79 10*3/MM3 (ref 3.4–10.8)

## 2024-03-07 PROCEDURE — 80050 GENERAL HEALTH PANEL: CPT

## 2024-03-07 PROCEDURE — 80061 LIPID PANEL: CPT

## 2024-03-07 PROCEDURE — 82306 VITAMIN D 25 HYDROXY: CPT

## 2024-03-14 ENCOUNTER — OFFICE VISIT (OUTPATIENT)
Dept: INTERNAL MEDICINE | Facility: CLINIC | Age: 54
End: 2024-03-14
Payer: COMMERCIAL

## 2024-03-14 VITALS
HEIGHT: 69 IN | DIASTOLIC BLOOD PRESSURE: 80 MMHG | TEMPERATURE: 97.3 F | RESPIRATION RATE: 18 BRPM | OXYGEN SATURATION: 98 % | SYSTOLIC BLOOD PRESSURE: 130 MMHG | HEART RATE: 67 BPM | WEIGHT: 186.13 LBS | BODY MASS INDEX: 27.57 KG/M2

## 2024-03-14 DIAGNOSIS — Z00.00 ANNUAL PHYSICAL EXAM: Primary | ICD-10-CM

## 2024-03-14 PROCEDURE — 99396 PREV VISIT EST AGE 40-64: CPT | Performed by: INTERNAL MEDICINE

## 2024-03-14 NOTE — PROGRESS NOTES
"Chief Complaint  Annual Exam (Annual physical and review labs )    Subjective     Lavell Pires is a 53 y.o. male who presents to [facility] for annual physical exam.     Has been having some trouble staying asleep.     Having pain in left elbow along biceps tendon. Tried NSAIDs and rest for about 1 week without much improvement.        Objective   Vital Signs:   Vitals:    03/14/24 0721   BP: 130/80   BP Location: Left arm   Patient Position: Sitting   Cuff Size: Adult   Pulse: 67   Resp: 18   Temp: 97.3 °F (36.3 °C)   TempSrc: Temporal   SpO2: 98%   Weight: 84.4 kg (186 lb 2 oz)   Height: 175.3 cm (69\")     Body mass index is 27.49 kg/m².    Wt Readings from Last 3 Encounters:   03/14/24 84.4 kg (186 lb 2 oz)   02/15/23 85 kg (187 lb 6 oz)   01/04/22 79.4 kg (175 lb)     BP Readings from Last 3 Encounters:   03/14/24 130/80   02/15/23 126/74   12/20/21 122/74       Health Maintenance   Topic Date Due    ZOSTER VACCINE (1 of 2) Never done    BMI FOLLOWUP  11/02/2022    COVID-19 Vaccine (4 - 2023-24 season) 09/01/2023    ANNUAL PHYSICAL  02/15/2024    INFLUENZA VACCINE  03/31/2024 (Originally 8/1/2023)    COLORECTAL CANCER SCREENING  06/04/2024    TDAP/TD VACCINES (2 - Td or Tdap) 12/23/2026    HEPATITIS C SCREENING  Completed    Pneumococcal Vaccine 0-64  Aged Out       Physical Exam  Vitals reviewed.   Constitutional:       Appearance: Normal appearance. He is well-developed.   HENT:      Head: Normocephalic and atraumatic.      Right Ear: Tympanic membrane and ear canal normal.      Left Ear: Tympanic membrane and ear canal normal.      Mouth/Throat:      Mouth: Mucous membranes are moist.      Pharynx: Oropharynx is clear. No oropharyngeal exudate.   Eyes:      Conjunctiva/sclera: Conjunctivae normal.      Pupils: Pupils are equal, round, and reactive to light.   Neck:      Thyroid: No thyromegaly or thyroid tenderness.   Cardiovascular:      Rate and Rhythm: " Normal rate and regular rhythm.      Heart sounds: No murmur heard.     No friction rub. No gallop.   Pulmonary:      Effort: Pulmonary effort is normal.      Breath sounds: Normal breath sounds. No wheezing or rhonchi.   Abdominal:      General: Abdomen is flat. Bowel sounds are normal. There is no distension.      Palpations: Abdomen is soft. There is no mass.      Tenderness: There is no abdominal tenderness.   Musculoskeletal:      Right lower leg: No edema.      Left lower leg: No edema.   Lymphadenopathy:      Cervical: No cervical adenopathy.   Skin:     General: Skin is warm and dry.   Neurological:      General: No focal deficit present.      Mental Status: He is alert and oriented to person, place, and time. Mental status is at baseline.   Psychiatric:         Mood and Affect: Mood and affect normal.         Thought Content: Thought content normal.          Result Review :  The following data was reviewed by: Abi Suarez MD on 03/14/2024:  CMP          3/7/2024    08:26   CMP   Glucose 93    BUN 16    Creatinine 1.21    EGFR 72.0    Sodium 140    Potassium 4.3    Chloride 104    Calcium 9.0    Total Protein 6.8    Albumin 4.4    Globulin 2.4    Total Bilirubin 0.6    Alkaline Phosphatase 56    AST (SGOT) 16    ALT (SGPT) 12    Albumin/Globulin Ratio 1.8    BUN/Creatinine Ratio 13.2    Anion Gap 10.0      CBC w/diff          3/7/2024    08:26   CBC w/Diff   WBC 6.79    RBC 4.97    Hemoglobin 15.8    Hematocrit 46.4    MCV 93.4    MCH 31.8    MCHC 34.1    RDW 12.0    Platelets 233    Neutrophil Rel % 53.3    Immature Granulocyte Rel % 0.1    Lymphocyte Rel % 35.8    Monocyte Rel % 9.4    Eosinophil Rel % 1.3    Basophil Rel % 0.1      Lipid Panel          3/7/2024    08:26   Lipid Panel   Total Cholesterol 189    Triglycerides 103    HDL Cholesterol 41    VLDL Cholesterol 19    LDL Cholesterol  129    LDL/HDL Ratio 3.11      TSH          3/7/2024    08:26   TSH   TSH 1.170      Lab Results    Component Value Date    DQVS96NP 73.7 03/07/2024          Procedures          Assessment & Plan  Annual physical exam  Screening labs reviewed/ordered  Counseling provided regarding age appropriate screenings and immunizations, healthy diet and exercise.              BMI is >= 25 and <30. (Overweight) The following options were offered after discussion;: exercise counseling/recommendations and nutrition counseling/recommendations         FOLLOW UP  Return in about 1 year (around 3/14/2025) for Annual physical, or sooner if needed.  Patient was given instructions and counseling regarding his condition or for health maintenance advice. Please see specific information pulled into the AVS if appropriate.       Abi Suarez MD  03/14/24  09:17 EDT    CURRENT & DISCONTINUED MEDICATIONS  Current Outpatient Medications   Medication Instructions    albuterol sulfate  (90 Base) MCG/ACT inhaler 1-2 puffs, Inhalation, Every 6 Hours PRN    carvedilol (COREG) 3.125 mg, Oral, 2 Times Daily    montelukast (SINGULAIR) 10 mg, Oral, Nightly    sildenafil (VIAGRA) 50 mg, Oral, Daily PRN    vitamin D (ERGOCALCIFEROL) 50,000 Units, Oral, Every 7 Days       There are no discontinued medications.

## 2024-05-06 ENCOUNTER — PREP FOR SURGERY (OUTPATIENT)
Dept: OTHER | Facility: HOSPITAL | Age: 54
End: 2024-05-06
Payer: COMMERCIAL

## 2024-05-06 ENCOUNTER — CLINICAL SUPPORT (OUTPATIENT)
Dept: GASTROENTEROLOGY | Facility: CLINIC | Age: 54
End: 2024-05-06
Payer: COMMERCIAL

## 2024-05-06 DIAGNOSIS — Z80.0 FAMILY HISTORY OF COLON CANCER: ICD-10-CM

## 2024-05-06 DIAGNOSIS — Z12.11 COLON CANCER SCREENING: Primary | ICD-10-CM

## 2024-05-06 DIAGNOSIS — Z86.010 HISTORY OF COLON POLYPS: ICD-10-CM

## 2024-05-06 PROBLEM — Z86.0100 HISTORY OF COLON POLYPS: Status: ACTIVE | Noted: 2024-05-06

## 2024-05-06 RX ORDER — SODIUM, POTASSIUM,MAG SULFATES 17.5-3.13G
1 SOLUTION, RECONSTITUTED, ORAL ORAL EVERY 12 HOURS
Qty: 354 ML | Refills: 0 | Status: SHIPPED | OUTPATIENT
Start: 2024-05-06

## 2024-06-25 ENCOUNTER — TELEPHONE (OUTPATIENT)
Dept: INTERNAL MEDICINE | Facility: CLINIC | Age: 54
End: 2024-06-25

## 2024-06-25 ENCOUNTER — OFFICE VISIT (OUTPATIENT)
Dept: INTERNAL MEDICINE | Facility: CLINIC | Age: 54
End: 2024-06-25
Payer: COMMERCIAL

## 2024-06-25 VITALS
HEART RATE: 65 BPM | HEIGHT: 69 IN | WEIGHT: 178.8 LBS | TEMPERATURE: 97.3 F | OXYGEN SATURATION: 97 % | RESPIRATION RATE: 18 BRPM | BODY MASS INDEX: 26.48 KG/M2 | DIASTOLIC BLOOD PRESSURE: 78 MMHG | SYSTOLIC BLOOD PRESSURE: 120 MMHG

## 2024-06-25 DIAGNOSIS — R35.0 URINARY FREQUENCY: ICD-10-CM

## 2024-06-25 DIAGNOSIS — Z12.5 SCREENING PSA (PROSTATE SPECIFIC ANTIGEN): ICD-10-CM

## 2024-06-25 DIAGNOSIS — R33.9 URINARY RETENTION: Primary | ICD-10-CM

## 2024-06-25 LAB
BACTERIA UR QL AUTO: NORMAL /HPF
BILIRUB UR QL STRIP: NEGATIVE
CLARITY UR: ABNORMAL
COLOR UR: ABNORMAL
GLUCOSE UR STRIP-MCNC: NEGATIVE MG/DL
HGB UR QL STRIP.AUTO: NEGATIVE
HYALINE CASTS UR QL AUTO: NORMAL /LPF
KETONES UR QL STRIP: NEGATIVE
LEUKOCYTE ESTERASE UR QL STRIP.AUTO: NEGATIVE
NITRITE UR QL STRIP: NEGATIVE
PH UR STRIP.AUTO: 6 [PH] (ref 5–8)
PROT UR QL STRIP: ABNORMAL
PSA SERPL-MCNC: 1.29 NG/ML (ref 0–4)
RBC # UR STRIP: NORMAL /HPF
REF LAB TEST METHOD: NORMAL
SP GR UR STRIP: 1.02 (ref 1–1.03)
SQUAMOUS #/AREA URNS HPF: NORMAL /HPF
UROBILINOGEN UR QL STRIP: ABNORMAL
WBC # UR STRIP: NORMAL /HPF

## 2024-06-25 PROCEDURE — 99214 OFFICE O/P EST MOD 30 MIN: CPT | Performed by: NURSE PRACTITIONER

## 2024-06-25 PROCEDURE — G0103 PSA SCREENING: HCPCS | Performed by: NURSE PRACTITIONER

## 2024-06-25 PROCEDURE — 81001 URINALYSIS AUTO W/SCOPE: CPT | Performed by: NURSE PRACTITIONER

## 2024-06-25 RX ORDER — FLUTICASONE FUROATE AND VILANTEROL 100; 25 UG/1; UG/1
1 POWDER RESPIRATORY (INHALATION)
Qty: 60 EACH | Refills: 0 | Status: ON HOLD | OUTPATIENT
Start: 2024-06-25 | End: 2024-07-01

## 2024-06-25 RX ORDER — PRAZOSIN HYDROCHLORIDE 1 MG/1
1 CAPSULE ORAL NIGHTLY
Qty: 30 CAPSULE | Refills: 0 | Status: CANCELLED | OUTPATIENT
Start: 2024-06-25

## 2024-06-25 RX ORDER — ALFUZOSIN HYDROCHLORIDE 10 MG/1
10 TABLET, EXTENDED RELEASE ORAL DAILY
Qty: 30 TABLET | Refills: 0 | Status: SHIPPED | OUTPATIENT
Start: 2024-06-25

## 2024-06-25 RX ORDER — ALBUTEROL SULFATE 90 UG/1
1-2 AEROSOL, METERED RESPIRATORY (INHALATION) EVERY 6 HOURS PRN
Qty: 8.5 G | Refills: 3 | Status: SHIPPED | OUTPATIENT
Start: 2024-06-25

## 2024-06-25 RX ORDER — FLUTICASONE FUROATE AND VILANTEROL TRIFENATATE 50; 25 UG/1; UG/1
1 POWDER RESPIRATORY (INHALATION) 2 TIMES DAILY
Qty: 60 EACH | Refills: 0 | Status: SHIPPED | OUTPATIENT
Start: 2024-06-25 | End: 2024-06-25

## 2024-06-25 NOTE — PRE-PROCEDURE INSTRUCTIONS
Instructed on date and arrival time of 0630 Come to entrance 'C'. Must have a  over age of 18 to drive home.May have two visitors; however children under the age of 12 must stay in waiting room. Discussed clear liquid diet(no red or purple), bowel prep,and NPO. May take medications as usual except for blood thinners,diabetic medications, and weight loss medications.verbalizes understanding of instructions given.Instructed to call for questions or concerns.

## 2024-06-25 NOTE — TELEPHONE ENCOUNTER
Telephone call to patient per provider to inform him that she has sent in Alfuzosin 10MG Daily for him to take. Patient has SULFA allergy so not prescribing Flomax. Patient is to follow up with Urology as discussed and ordered. Patient is aware. Micro ordered from UA done in office did show PRO TRACE provider is aware.

## 2024-06-25 NOTE — PROGRESS NOTES
"Chief Complaint  Urinary Tract Infection (Feeling like he has to empty his bladder after has after emptied it- 2 weeks ) and Cough (Patient stated that he has spasm coughs and was prescribed Breo/ albuterol Inhaler and needing a new one. )    Subjective          Lavell Pires presents to [facility]  History of Present Illness  Reports having urinary frequency in the last 3 wks. Denies fever, chills, body aches  Denies dysuria.  Reports some hesitation of stream and feels like he is not emptying his bladder  Denies concern for std  Denies rectal/prostate pain  No family hx of prostate cancer    He reports having a history of spasmatic cough that is seasonal  Previously has used breo for 1 mth and this resolved  Denies feeling soa  Quit taking singulair in may as he typically only has allergy sx from dec thru may  Objective   Vital Signs:   /78 (BP Location: Left arm, Patient Position: Sitting, Cuff Size: Adult)   Pulse 65   Temp 97.3 °F (36.3 °C)   Resp 18   Ht 175.3 cm (69\")   Wt 81.1 kg (178 lb 12.8 oz)   SpO2 97%   BMI 26.40 kg/m²     Physical Exam  Vitals and nursing note reviewed.   Constitutional:       General: He is not in acute distress.     Appearance: Normal appearance.   HENT:      Head: Normocephalic and atraumatic.      Right Ear: Tympanic membrane, ear canal and external ear normal.      Left Ear: Tympanic membrane, ear canal and external ear normal.      Nose: Nose normal. No rhinorrhea.      Mouth/Throat:      Mouth: Mucous membranes are moist.      Pharynx: No posterior oropharyngeal erythema.   Eyes:      Conjunctiva/sclera: Conjunctivae normal.   Cardiovascular:      Rate and Rhythm: Normal rate and regular rhythm.      Pulses: Normal pulses.      Heart sounds: Normal heart sounds. No murmur heard.     No friction rub. No gallop.   Pulmonary:      Effort: Pulmonary effort is normal. No respiratory distress.      Breath sounds: " Wheezing (minimal expiratory wheeze of upper lobes bilaterally) present. No rhonchi or rales.   Abdominal:      General: There is no distension.      Palpations: Abdomen is soft. There is no mass.      Tenderness: There is no abdominal tenderness. There is no right CVA tenderness or left CVA tenderness.   Musculoskeletal:      Cervical back: Neck supple.      Right lower leg: No edema.      Left lower leg: No edema.   Lymphadenopathy:      Cervical: No cervical adenopathy.   Skin:     General: Skin is warm and dry.   Neurological:      General: No focal deficit present.      Mental Status: He is alert and oriented to person, place, and time.   Psychiatric:         Mood and Affect: Mood normal.         Behavior: Behavior normal.        Result Review :          Procedures      Assessment and Plan    Diagnoses and all orders for this visit:    1. Urinary retention (Primary)  -     Urinalysis With Culture If Indicated - Urine, Clean Catch  -     Ambulatory Referral to Urology  -     Urinalysis, Microscopic Only - Urine, Clean Catch    2. Screening PSA (prostate specific antigen)  -     PSA Screen    3. Urinary frequency  -     Ambulatory Referral to Urology    Other orders  -     Fluticasone Furoate-Vilanterol (Breo Ellipta) 50-25 MCG/ACT aerosol powder ; Inhale 1 puff 2 (Two) Times a Day.  Dispense: 60 each; Refill: 0  -     albuterol sulfate  (90 Base) MCG/ACT inhaler; Inhale 1-2 puffs Every 6 (Six) Hours As Needed for Wheezing or Shortness of Air.  Dispense: 8.5 g; Refill: 3  -     alfuzosin (UROXATRAL) 10 MG 24 hr tablet; Take 1 tablet by mouth Daily.  Dispense: 30 tablet; Refill: 0    UA unremarkable in office today. Suspicious for BPH. Checking screening PSA. Will start alfuzosin and send to urology for further eval.     Discussed concern that cough may be related to reactive airway versus drainage secondary to allergies.  Minimal expiratory wheeze on exam.  Will treat with Breo x 2 to 4 weeks.  Albuterol  as needed.  He will also restart Singulair and continue through the fall.  Patient will call if symptoms or not improved as discussed          Follow Up   Return if symptoms worsen or fail to improve.  Patient was given instructions and counseling regarding his condition or for health maintenance advice. Please see specific information pulled into the AVS if appropriate.

## 2024-06-27 ENCOUNTER — TELEPHONE (OUTPATIENT)
Dept: INTERNAL MEDICINE | Facility: CLINIC | Age: 54
End: 2024-06-27
Payer: COMMERCIAL

## 2024-06-27 NOTE — TELEPHONE ENCOUNTER
Caller: Lavell Pires    Relationship to patient: Self    Best call back number: [phone]    Patient is needing: PATIENT CALLED REQUESTING TO SPEAK WITH CLINICAL STAFF. PATIENT STATES HE WAS SEEN ON 6.25.24 FOR A POSSIBLE UTI BUT ALL TEST CAME BACK NORMAL. PATIENT IS NEEDING TO KNOW IF HE SHOULD CONTINUE TO TAKE THE ALFUZOSIN 10MG THAT WAS PRESCRIBED.

## 2024-06-28 ENCOUNTER — ANESTHESIA EVENT (OUTPATIENT)
Dept: GASTROENTEROLOGY | Facility: HOSPITAL | Age: 54
End: 2024-06-28
Payer: COMMERCIAL

## 2024-06-28 NOTE — ANESTHESIA PREPROCEDURE EVALUATION
Anesthesia Evaluation     Patient summary reviewed and Nursing notes reviewed   no history of anesthetic complications:   NPO Solid Status: > 8 hours  NPO Liquid Status: > 2 hours           Airway   Mallampati: II  TM distance: >3 FB  Neck ROM: full  No difficulty expected  Dental - normal exam     Pulmonary - negative pulmonary ROS and normal exam    breath sounds clear to auscultation    ROS comment: Chronic cough - takes albuterol  Cardiovascular - negative cardio ROS and normal exam  Exercise tolerance: good (4-7 METS)    ECG reviewed  Patient on routine beta blocker and Beta blocker given within 24 hours of surgery  Rhythm: regular  Rate: normal    (+) hypertension, dysrhythmias (Took Coreg this AM) Tachycardia      Neuro/Psych- negative ROS  (+) dizziness/light headedness  GI/Hepatic/Renal/Endo - negative ROS     Musculoskeletal (-) negative ROS    Abdominal    Substance History - negative use     OB/GYN negative ob/gyn ROS         Other - negative ROS       ROS/Med Hx Other: PAT Nursing Notes unavailable.     EKG 12/20/2021:  HR 67 bpm  Sinus rhythm  No previous ECG available for comparison                      Anesthesia Plan    ASA 2     general     (Total IV Anesthesia    Patient understands anesthesia not responsible for dental damage.  )  intravenous induction     Anesthetic plan, risks, benefits, and alternatives have been provided, discussed and informed consent has been obtained with: patient.    Plan discussed with CRNA.        CODE STATUS:

## 2024-07-01 ENCOUNTER — HOSPITAL ENCOUNTER (OUTPATIENT)
Facility: HOSPITAL | Age: 54
Setting detail: HOSPITAL OUTPATIENT SURGERY
Discharge: HOME | End: 2024-07-01
Attending: INTERNAL MEDICINE | Admitting: INTERNAL MEDICINE
Payer: COMMERCIAL

## 2024-07-01 ENCOUNTER — ANESTHESIA (OUTPATIENT)
Dept: GASTROENTEROLOGY | Facility: HOSPITAL | Age: 54
End: 2024-07-01
Payer: COMMERCIAL

## 2024-07-01 VITALS
SYSTOLIC BLOOD PRESSURE: 115 MMHG | TEMPERATURE: 97.2 F | RESPIRATION RATE: 16 BRPM | OXYGEN SATURATION: 94 % | DIASTOLIC BLOOD PRESSURE: 81 MMHG | HEART RATE: 66 BPM

## 2024-07-01 DIAGNOSIS — Z12.11 COLON CANCER SCREENING: ICD-10-CM

## 2024-07-01 DIAGNOSIS — Z86.010 HISTORY OF COLON POLYPS: ICD-10-CM

## 2024-07-01 DIAGNOSIS — Z80.0 FAMILY HISTORY OF COLON CANCER: ICD-10-CM

## 2024-07-01 PROCEDURE — 88305 TISSUE EXAM BY PATHOLOGIST: CPT | Performed by: INTERNAL MEDICINE

## 2024-07-01 PROCEDURE — 25810000003 LACTATED RINGERS PER 1000 ML: Performed by: NURSE ANESTHETIST, CERTIFIED REGISTERED

## 2024-07-01 PROCEDURE — 25010000002 PROPOFOL 10 MG/ML EMULSION: Performed by: NURSE ANESTHETIST, CERTIFIED REGISTERED

## 2024-07-01 RX ORDER — LIDOCAINE HYDROCHLORIDE 20 MG/ML
INJECTION, SOLUTION EPIDURAL; INFILTRATION; INTRACAUDAL; PERINEURAL AS NEEDED
Status: DISCONTINUED | OUTPATIENT
Start: 2024-07-01 | End: 2024-07-01 | Stop reason: SURG

## 2024-07-01 RX ORDER — PROPOFOL 10 MG/ML
VIAL (ML) INTRAVENOUS AS NEEDED
Status: DISCONTINUED | OUTPATIENT
Start: 2024-07-01 | End: 2024-07-01 | Stop reason: SURG

## 2024-07-01 RX ORDER — SODIUM CHLORIDE, SODIUM LACTATE, POTASSIUM CHLORIDE, CALCIUM CHLORIDE 600; 310; 30; 20 MG/100ML; MG/100ML; MG/100ML; MG/100ML
INJECTION, SOLUTION INTRAVENOUS CONTINUOUS PRN
Status: DISCONTINUED | OUTPATIENT
Start: 2024-07-01 | End: 2024-07-01 | Stop reason: SURG

## 2024-07-01 RX ORDER — SODIUM CHLORIDE, SODIUM LACTATE, POTASSIUM CHLORIDE, CALCIUM CHLORIDE 600; 310; 30; 20 MG/100ML; MG/100ML; MG/100ML; MG/100ML
30 INJECTION, SOLUTION INTRAVENOUS CONTINUOUS
Status: DISCONTINUED | OUTPATIENT
Start: 2024-07-01 | End: 2024-07-01 | Stop reason: HOSPADM

## 2024-07-01 RX ADMIN — SODIUM CHLORIDE, POTASSIUM CHLORIDE, SODIUM LACTATE AND CALCIUM CHLORIDE 30 ML/HR: 600; 310; 30; 20 INJECTION, SOLUTION INTRAVENOUS at 06:58

## 2024-07-01 RX ADMIN — LIDOCAINE HYDROCHLORIDE 100 MG: 20 INJECTION, SOLUTION INTRAVENOUS at 07:33

## 2024-07-01 RX ADMIN — SODIUM CHLORIDE, POTASSIUM CHLORIDE, SODIUM LACTATE AND CALCIUM CHLORIDE: 600; 310; 30; 20 INJECTION, SOLUTION INTRAVENOUS at 07:31

## 2024-07-01 RX ADMIN — PROPOFOL 100 MG: 10 INJECTION, EMULSION INTRAVENOUS at 07:34

## 2024-07-01 RX ADMIN — PROPOFOL 200 MCG/KG/MIN: 10 INJECTION, EMULSION INTRAVENOUS at 07:34

## 2024-07-01 NOTE — H&P
ScreeningPre Procedure History & Physical    Chief Complaint:   Screening     Subjective     HPI:   Screening     Past Medical History:   Past Medical History:   Diagnosis Date    Colon polyp     History of medical problems 2008    Tachycardia    Inguinal hernia     Tachycardia     only sees PCP, controlled for past 15 years       Past Surgical History:  Past Surgical History:   Procedure Laterality Date    COLONOSCOPY      HERNIA REPAIR  2021    INGUINAL HERNIA REPAIR Left 12/20/2021    Procedure: INGUINAL HERNIA REPAIR LAPAROSCOPIC WITH DAVINCI ROBOT, left;  Surgeon: Corona Kolb MD;  Location: [facility] MAIN OR;  Service: Robotics - DaVinci;  Laterality: Left;    INGUINAL HERNIA REPAIR  2021    VASECTOMY         Family History:  Family History   Problem Relation Age of Onset    Alcohol abuse Father     Colon cancer Maternal Uncle        Social History:   reports that he has never smoked. He has been exposed to tobacco smoke. He has never used smokeless tobacco. He reports that he does not drink alcohol and does not use drugs.    Medications:   Medications Prior to Admission   Medication Sig Dispense Refill Last Dose    albuterol sulfate  (90 Base) MCG/ACT inhaler Inhale 1-2 puffs Every 6 (Six) Hours As Needed for Wheezing or Shortness of Air. 8.5 g 3 6/30/2024    alfuzosin (UROXATRAL) 10 MG 24 hr tablet Take 1 tablet by mouth Daily. 30 tablet 0 6/30/2024    carvedilol (COREG) 3.125 MG tablet Take 1 tablet by mouth 2 (Two) Times a Day. 180 tablet 3 7/1/2024    Probiotic Product (PROBIOTIC DAILY PO) Take  by mouth.   Past Week    vitamin D (ERGOCALCIFEROL) 1.25 MG (27864 UT) capsule capsule Take 1 capsule by mouth Every 7 (Seven) Days. 12 capsule 1 Past Week    montelukast (SINGULAIR) 10 MG tablet Take 1 tablet by mouth Every Night. 90 tablet 3 More than a month    sildenafil (Viagra) 50 MG tablet Take 1 tablet by mouth Daily As Needed for Erectile Dysfunction. 10 tablet 0         Allergies:  Sulfamethoxazole-trimethoprim        Objective     Blood pressure 147/92, pulse 65, temperature 97.2 °F (36.2 °C), temperature source Temporal, resp. rate 16, SpO2 98%.    Physical Exam   Constitutional: Pt is oriented to person, place, and time and well-developed, well-nourished, and in no distress.   Mouth/Throat: Oropharynx is clear and moist.   Neck: Normal range of motion.   Cardiovascular: Normal rate, regular rhythm and normal heart sounds.    Pulmonary/Chest: Effort normal and breath sounds normal.   Abdominal: Soft. Nontender  Skin: Skin is warm and dry.   Psychiatric: Mood, memory, affect and judgment normal.     Assessment & Plan     Diagnosis:  Screening colonoscopy  H/o colon polyps     Anticipated Surgical Procedure:  colonoscopy    The risks, benefits, and alternatives of this procedure have been discussed with the patient or the responsible party- the patient understands and agrees to proceed.

## 2024-07-01 NOTE — ANESTHESIA POSTPROCEDURE EVALUATION
Patient: Lavell Pires    Procedure Summary       Date: 07/01/24 Room / Location: [facility] ENDOSCOPY 2 / [facility] ENDOSCOPY    Anesthesia Start: 0731 Anesthesia Stop: 0754    Procedure: COLONOSCOPY WITH COLD SNARE POLYPECTOMY Diagnosis:       Colon cancer screening      Family history of colon cancer      History of colon polyps      (Colon cancer screening [Z12.11])      (Family history of colon cancer [Z80.0])      (History of colon polyps [Z86.010])    Surgeons: Kaushik Epps MD Provider: Joy Dsouza CRNA    Anesthesia Type: general ASA Status: 2            Anesthesia Type: general    Vitals  Vitals Value Taken Time   /81 07/01/24 0814   Temp 36.1 °C (96.9 °F) 07/01/24 0754   Pulse 61 07/01/24 0816   Resp 16 07/01/24 0804   SpO2 96 % 07/01/24 0816   Vitals shown include unfiled device data.        Post Anesthesia Care and Evaluation    Post-procedure mental status: acceptable.  Pain management: satisfactory to patient    Airway patency: patent  Anesthetic complications: No anesthetic complications    Cardiovascular status: acceptable  Respiratory status: acceptable    Comments: Per chart review

## 2024-07-02 LAB
CYTO UR: NORMAL
LAB AP CASE REPORT: NORMAL
LAB AP CLINICAL INFORMATION: NORMAL
PATH REPORT.FINAL DX SPEC: NORMAL
PATH REPORT.GROSS SPEC: NORMAL

## 2024-07-03 ENCOUNTER — TELEPHONE (OUTPATIENT)
Dept: UROLOGY | Facility: CLINIC | Age: 54
End: 2024-07-03

## 2024-07-03 NOTE — TELEPHONE ENCOUNTER
Provider: FELIX THOMAS    Caller: PAULINA NAQVI    Relationship to Patient: SELF    Reason for Call: PATIENT CALLED TODAY TO SCHEDULE PER THE REFERRAL NOTE HUB TO SCHEDULE NEXT AVAILABLE APPT. THIS IS AUGUST 8 OR 9TH. THIS DX IS URINARY RETENTION. WANTED TO MAKE SURE PATIENT IS OKAY TO WAIT THIS LONG

## 2024-07-05 ENCOUNTER — TELEPHONE (OUTPATIENT)
Dept: INTERNAL MEDICINE | Facility: CLINIC | Age: 54
End: 2024-07-05

## 2024-07-05 NOTE — TELEPHONE ENCOUNTER
Caller: Lavell Pires    Relationship: Self    Best call back number: [phone]    What medication are you requesting: STRONGER MEDICATION TO HELP     What are your current symptoms: COUGH    Have you had these symptoms before:    [x] Yes  [] No    Have you been treated for these symptoms before:   [x] Yes  [] No    If a prescription is needed, what is your preferred pharmacy and phone number: [facility] - [place]     Additional notes:  PATIENT SAW JUAN J BANEGAS 06/25/2024 FOR THIS ISSUE BUT THE MEDICATIONS HE WAS PRESCRIBED IS NOT HELPING, AND WOULD LIKE A CALL BACK TO LET HIM KNOW WHAT THE NEXT STEP WOULD BE.

## 2024-07-17 RX ORDER — ERGOCALCIFEROL 1.25 MG/1
50000 CAPSULE ORAL
Qty: 12 CAPSULE | Refills: 1 | Status: SHIPPED | OUTPATIENT
Start: 2024-07-17

## 2024-07-31 ENCOUNTER — OFFICE VISIT (OUTPATIENT)
Dept: INTERNAL MEDICINE | Facility: CLINIC | Age: 54
End: 2024-07-31
Payer: COMMERCIAL

## 2024-07-31 VITALS
OXYGEN SATURATION: 96 % | HEIGHT: 69 IN | HEART RATE: 86 BPM | SYSTOLIC BLOOD PRESSURE: 116 MMHG | WEIGHT: 176 LBS | DIASTOLIC BLOOD PRESSURE: 80 MMHG | BODY MASS INDEX: 26.07 KG/M2

## 2024-07-31 DIAGNOSIS — R05.2 SUBACUTE COUGH: Primary | ICD-10-CM

## 2024-07-31 PROCEDURE — 99213 OFFICE O/P EST LOW 20 MIN: CPT | Performed by: INTERNAL MEDICINE

## 2024-08-01 NOTE — PROGRESS NOTES
"Chief Complaint  Cough    Subjective      Lavell Pires is a 54 y.o. male who presents to [facility]     Presenting for evaluation of cough ongoing x 6-8 weeks. States that cough occurs whenever he tries to talk. Cough is dry and hacking.   He previously saw  for this and was treated with Z-pack and steroids. This did not seem to help much.   He has not noticed any hoarseness of his voice.   He does have allergies, which have been worse lately.   He denies any burning chest pain, sour taste, etc.     Objective   Vital Signs:   Vitals:    07/31/24 1456   BP: 116/80   Pulse: 86   SpO2: 96%   Weight: 79.8 kg (176 lb)   Height: 175.3 cm (69\")     Body mass index is 25.99 kg/m².    Wt Readings from Last 3 Encounters:   07/31/24 79.8 kg (176 lb)   06/25/24 81.1 kg (178 lb 12.8 oz)   03/14/24 84.4 kg (186 lb 2 oz)     BP Readings from Last 3 Encounters:   07/31/24 116/80   07/01/24 115/81   06/25/24 120/78       Health Maintenance   Topic Date Due    ZOSTER VACCINE (1 of 2) Never done    INFLUENZA VACCINE  08/01/2024    COVID-19 Vaccine (4 - 2023-24 season) 08/02/2024 (Originally 9/1/2023)    ANNUAL PHYSICAL  03/14/2025    BMI FOLLOWUP  03/14/2025    TDAP/TD VACCINES (2 - Td or Tdap) 12/23/2026    COLORECTAL CANCER SCREENING  07/01/2029    HEPATITIS C SCREENING  Completed    Pneumococcal Vaccine 0-64  Aged Out       Physical Exam  Vitals reviewed.   Constitutional:       Appearance: Normal appearance. He is well-developed.   HENT:      Head: Normocephalic and atraumatic.      Mouth/Throat:      Mouth: Mucous membranes are moist.      Pharynx: No oropharyngeal exudate or posterior oropharyngeal erythema.   Eyes:      Conjunctiva/sclera: Conjunctivae normal.      Pupils: Pupils are equal, round, and reactive to light.   Neck:      Thyroid: No thyromegaly or thyroid tenderness.   Cardiovascular:      Rate and Rhythm: Normal rate and regular rhythm.      Heart sounds: No " murmur heard.     No friction rub. No gallop.   Pulmonary:      Effort: Pulmonary effort is normal.      Breath sounds: Normal breath sounds. No wheezing or rhonchi.   Lymphadenopathy:      Cervical: No cervical adenopathy.   Skin:     General: Skin is warm and dry.   Neurological:      Mental Status: He is alert and oriented to person, place, and time.   Psychiatric:         Mood and Affect: Affect normal.          Result Review :  The following data was reviewed by: Abi Suarez MD on 07/31/2024:         Procedures          Assessment & Plan  Subacute cough  Discussed potential etiologies of prolonged irritant cough including chronic post nasal drainage, GERD, vocal cord spasm.   Will start with Flonase to help with any chronic post nasal drainage. After a 2-3 week trial, if not improving, will start PPI such as Prilosec or Nexium to treat potential GERD. Patient prefers to get these medications over the counter.   If after trial of these medications,  he is still having bothersome symptoms, he will return to clinic for further evaluation and potential referral to ENT.                        FOLLOW UP  Return if symptoms worsen or fail to improve.  Patient was given instructions and counseling regarding his condition or for health maintenance advice. Please see specific information pulled into the AVS if appropriate.       Abi Suarez MD  08/01/24  09:15 EDT    CURRENT & DISCONTINUED MEDICATIONS  Current Outpatient Medications   Medication Instructions    albuterol sulfate  (90 Base) MCG/ACT inhaler 1-2 puffs, Inhalation, Every 6 Hours PRN    alfuzosin (UROXATRAL) 10 mg, Oral, Daily    azithromycin (Zithromax Z-Luigi) 250 MG tablet Take 2 tablets by mouth on day 1, then take 1 tablet daily on days 2-5    carvedilol (COREG) 3.125 mg, Oral, 2 Times Daily    methylPREDNISolone (Medrol) 4 MG dose pack Take as directed on package instructions    montelukast (SINGULAIR) 10 mg, Oral,  Nightly    Probiotic Product (PROBIOTIC DAILY PO) Oral    sildenafil (VIAGRA) 50 mg, Oral, Daily PRN    vitamin D (ERGOCALCIFEROL) 50,000 Units, Oral, Every 7 Days       There are no discontinued medications.

## 2024-10-09 RX ORDER — ALFUZOSIN HYDROCHLORIDE 10 MG/1
10 TABLET, EXTENDED RELEASE ORAL DAILY
Qty: 30 TABLET | Refills: 0 | Status: SHIPPED | OUTPATIENT
Start: 2024-10-09

## 2024-10-09 RX ORDER — ERGOCALCIFEROL 1.25 MG/1
50000 CAPSULE, LIQUID FILLED ORAL
Qty: 12 CAPSULE | Refills: 1 | Status: SHIPPED | OUTPATIENT
Start: 2024-10-09

## 2024-11-22 RX ORDER — MONTELUKAST SODIUM 10 MG/1
10 TABLET ORAL NIGHTLY
Qty: 90 TABLET | Refills: 3 | Status: SHIPPED | OUTPATIENT
Start: 2024-11-22

## 2024-11-25 NOTE — PROGRESS NOTES
Chief Complaint: Urinary Frequency    Subjective         History of Present Illness  Lavell Pires is a 54 y.o. male presents to [facility] to be seen for urinary frequency/retention.    His symptoms began over the summer.  He feels the urge to urinate frequently. Sometimes feels like he is not emptying his bladder well. His symptoms seem somewhat variable day to day.  He was treated with a trial of alfuzosin for about three weeks a couple of months ago, but felt like that made his frequency worse.       He is also concerned with ED.  He has trouble getting and maintaining an erection. He has had issues with ED for several years.  He has taken sildenafil 50mg and it has not been effective.      Frequency-admits    Urgency-sometimes    Incontinence-denies     Nocturia-denies    Perineal pain-over the summer, none currently     Dysuria-denies     Stream-occasionally weak    GH-denies     History of stones-denies      surgeries-vasectomy     Family history of  malignancy-denies     Cardiopulmonary-tachycardia    Anticoagulants-none    Smoker-denies     PSA  6/25/2024 1.29    Objective     Past Medical History:   Diagnosis Date    Colon polyp     History of medical problems 2008    Tachycardia    Inguinal hernia     Tachycardia     only sees PCP, controlled for past 15 years       Past Surgical History:   Procedure Laterality Date    COLONOSCOPY      COLONOSCOPY N/A 7/1/2024    Procedure: COLONOSCOPY WITH COLD SNARE POLYPECTOMY;  Surgeon: Kaushik Epps MD;  Location: [facility];  Service: Gastroenterology;  Laterality: N/A;  COLON POLYP    HERNIA REPAIR  2021    INGUINAL HERNIA REPAIR Left 12/20/2021    Procedure: INGUINAL HERNIA REPAIR LAPAROSCOPIC WITH DAVINCI ROBOT, left;  Surgeon: Corona Kolb MD;  Location: [facility] MAIN OR;  Service: Robotics - DaVinci;  Laterality: Left;    INGUINAL HERNIA REPAIR  2021    VASECTOMY           Current Outpatient Medications:     " carvedilol (COREG) 3.125 MG tablet, Take 1 tablet by mouth 2 (Two) Times a Day., Disp: 180 tablet, Rfl: 3    montelukast (SINGULAIR) 10 MG tablet, Take 1 tablet by mouth Every Night., Disp: 90 tablet, Rfl: 3    Probiotic Product (PROBIOTIC DAILY PO), Take  by mouth., Disp: , Rfl:     vitamin D (ERGOCALCIFEROL) 1.25 MG (14880 UT) capsule capsule, Take 1 capsule by mouth Every 7 (Seven) Days., Disp: 12 capsule, Rfl: 1    tadalafil (CIALIS) 5 MG tablet, Take 1 tablet by mouth Daily., Disp: 30 tablet, Rfl: 2    Allergies   Allergen Reactions    Sulfamethoxazole-Trimethoprim Rash and Other (See Comments)     Elevated liver enzymes        Family History   Problem Relation Age of Onset    Alcohol abuse Father     Colon cancer Maternal Uncle        Social History     Socioeconomic History    Marital status:    Tobacco Use    Smoking status: Never     Passive exposure: Past    Smokeless tobacco: Never   Vaping Use    Vaping status: Never Used   Substance and Sexual Activity    Alcohol use: Never    Drug use: Never    Sexual activity: Defer       Vital Signs:   Ht 175.3 cm (69\")   Wt 79.8 kg (175 lb 14.8 oz)   BMI 25.98 kg/m²      Physical Exam  Vitals reviewed.   Constitutional:       Appearance: Normal appearance.   Neurological:      General: No focal deficit present.      Mental Status: He is alert and oriented to person, place, and time.   Psychiatric:         Mood and Affect: Mood normal.         Behavior: Behavior normal.          Result Review :   The following data was reviewed by: TC Allred on 11/26/2024:     PSA          6/25/2024    09:32   PSA   PSA 1.290        Bladder Scan interpretation 11/26/2024    Estimation of residual urine via BVI 3000 Verathon Bladder Scan  MA/nurse performing: Yasmin MARTÍNEZ MA  Residual Urine: 50 ml  Indication: Benign prostatic hyperplasia with lower urinary tract symptoms, symptom details unspecified    Other male erectile dysfunction   Position: " Supine  Examination: Incremental scanning of the suprapubic area using 2.0 MHz transducer using copious amounts of acoustic gel.   Findings: An anechoic area was demonstrated which represented the bladder, with measurement of residual urine as noted. I inspected this myself. In that the residual urine was stable or insignificant, refer to plan for treatment and plan necessary at this time.       Procedures        Assessment and Plan    Diagnoses and all orders for this visit:    1. Benign prostatic hyperplasia with lower urinary tract symptoms, symptom details unspecified (Primary)  -     Bladder Scan  -     tadalafil (CIALIS) 5 MG tablet; Take 1 tablet by mouth Daily.  Dispense: 30 tablet; Refill: 2    2. Other male erectile dysfunction    BPH with Luts- behavioral modifications including fluid management, limiting fluids prior to sleep, and voiding immediately prior to sleep.      Continue conservative management of BPH Luts via behavioral modifications and medications; monitor for adverse outcomes of BPH which would warrant discussion of further management (ie hydronephrosis, recurrent uti, bladder stones, urinary retention, or renal insufficiency).    Will start Cialis 5 mg daily for BPH and ED.  Patient was advised that if he has an erection lasting longer than 4 hours he needs to go to the ED.  He was also instructed that if he ever has to start medications containing nitroglycerin that he will no longer be able to take this medication.  The patient verbalized understanding.    Plan to see the patient back in the office in 2 months or sooner for any new concerns.    Follow Up   Return in about 2 months (around 1/26/2025).  Patient was given instructions and counseling regarding his condition or for health maintenance advice. Please see specific information pulled into the AVS if appropriate.         This document has been electronically signed by TC Allred  November 26, 2024 11:44 EST

## 2024-11-26 ENCOUNTER — OFFICE VISIT (OUTPATIENT)
Dept: UROLOGY | Age: 54
End: 2024-11-26
Payer: COMMERCIAL

## 2024-11-26 VITALS — HEIGHT: 69 IN | BODY MASS INDEX: 26.06 KG/M2 | WEIGHT: 175.93 LBS

## 2024-11-26 DIAGNOSIS — N40.1 BENIGN PROSTATIC HYPERPLASIA WITH LOWER URINARY TRACT SYMPTOMS, SYMPTOM DETAILS UNSPECIFIED: Primary | ICD-10-CM

## 2024-11-26 DIAGNOSIS — N52.8 OTHER MALE ERECTILE DYSFUNCTION: ICD-10-CM

## 2024-11-26 LAB — URINE VOLUME: 50

## 2024-11-26 RX ORDER — TADALAFIL 5 MG/1
5 TABLET ORAL DAILY
Qty: 30 TABLET | Refills: 2 | Status: SHIPPED | OUTPATIENT
Start: 2024-11-26

## 2024-12-02 ENCOUNTER — TELEPHONE (OUTPATIENT)
Dept: INTERNAL MEDICINE | Facility: CLINIC | Age: 54
End: 2024-12-02
Payer: COMMERCIAL

## 2024-12-02 DIAGNOSIS — M25.529 ELBOW PAIN, UNSPECIFIED LATERALITY: Primary | ICD-10-CM

## 2024-12-02 NOTE — TELEPHONE ENCOUNTER
Caller: Lavell Pires     Best call back number: [phone]    What is your medical concern? PATIENT HAS HAD TROUBLE WITH MUSCULAR OR TENDON CAUSING A LOT OF PAIN. HE MENTIONED THAT THIS WAS DISCUSSED DURING MARCH, 2024, PHYSICAL APPOINTMENT WITH [facility] BUT IT IS STILL CAUSING PAIN. HE WOULD LIKE TO KNOW WHAT TO DO GOING FORWARD.     How long has this issue been going on? FOR A WHILE NOW     Is your provider already aware of this issue? YES    Have you been treated for this issue? NO

## 2024-12-10 ENCOUNTER — OFFICE VISIT (OUTPATIENT)
Dept: ORTHOPEDIC SURGERY | Facility: CLINIC | Age: 54
End: 2024-12-10
Payer: COMMERCIAL

## 2024-12-10 VITALS
OXYGEN SATURATION: 97 % | HEART RATE: 74 BPM | BODY MASS INDEX: 25.92 KG/M2 | WEIGHT: 175 LBS | DIASTOLIC BLOOD PRESSURE: 86 MMHG | SYSTOLIC BLOOD PRESSURE: 128 MMHG | HEIGHT: 69 IN

## 2024-12-10 DIAGNOSIS — M75.22 BICEPS TENDONITIS ON LEFT: Primary | ICD-10-CM

## 2024-12-10 RX ORDER — DICLOFENAC SODIUM 75 MG/1
75 TABLET, DELAYED RELEASE ORAL 2 TIMES DAILY
Qty: 60 TABLET | Refills: 0 | Status: SHIPPED | OUTPATIENT
Start: 2024-12-10

## 2024-12-10 NOTE — PROGRESS NOTES
"Chief Complaint  Initial Evaluation of the Left Upper Arm     Subjective      Lavell Pires presents to [facility] for initial evaluation of the left upper arm.  He was working out in the gym and has noted he cannot feel anything in the bicep and has  pain at the distal biceps.  This has been going on for about 9 months.  He has stopped going to the gym.  He has not ever felt a pop or snap.  He has rested and tried massage of the left upper arm.      Allergies   Allergen Reactions    Sulfamethoxazole-Trimethoprim Rash and Other (See Comments)     Elevated liver enzymes        Social History     Socioeconomic History    Marital status:    Tobacco Use    Smoking status: Never     Passive exposure: Past    Smokeless tobacco: Never   Vaping Use    Vaping status: Never Used   Substance and Sexual Activity    Alcohol use: Never    Drug use: Never    Sexual activity: Defer        I reviewed the patient's chief complaint, history of present illness, review of systems, past medical history, surgical history, family history, social history, medications, and allergy list.     Review of Systems     Constitutional: Denies fevers, chills, weight loss  Cardiovascular: Denies chest pain, shortness of breath  Skin: Denies rashes, acute skin changes  Neurologic: Denies headache, loss of consciousness        Vital Signs:   /86   Pulse 74   Ht 175.3 cm (69\")   Wt 79.4 kg (175 lb)   SpO2 97%   BMI 25.84 kg/m²          Physical Exam  General: Alert. No acute distress    Ortho Exam        LEFT UPPER ARM Tender with palpation of the distal biceps. Sensation to light touch median, radial, ulnar nerve. Positive AIN, PIN, ulnar nerve motor function. Axillary sensation intact. Elbow ROM 0-150. Negative Tinel's at the elbow. Pain with resisted wrist and long finger extension. Pain with resisted pronation and supination.        Procedures      Imaging Results (Most Recent)       None         "     Result Review              Assessment and Plan     Diagnoses and all orders for this visit:    1. Biceps tendonitis on left (Primary)        Discussed the treatment plan with the patient.     Prescribed anti inflammatory,  HEP exercises.  Prescribed physical therapy.      Call or return if worsening symptoms.    Follow Up     PRN      Patient was given instructions and counseling regarding his condition or for health maintenance advice. Please see specific information pulled into the AVS if appropriate.     Scribed for Akua Santos MD by Emily Christy MA.  12/10/24   09:29 EST    I have personally performed the services described in this document as scribed by the above individual and it is both accurate and complete. Akua Santos MD 12/10/24

## 2025-02-19 NOTE — PROGRESS NOTES
Chief Complaint: Benign Prostatic Hypertrophy    Subjective         History of Present Illness  Lavell Pires is a 54 y.o. male presents to [facility] to be seen for follow-up.    Patient was previously seen by me with last visit on 11/26/2024 for BPH and ED.  He was started on daily tadalafil to see if this would help both symptoms.  He is here for follow-up.    He reports tadalafil is working well.    Previous 11/26/2024:  Lavell Pires is a 54 y.o. male presents to [facility] to be seen for urinary frequency/retention.     His symptoms began over the summer.  He feels the urge to urinate frequently. Sometimes feels like he is not emptying his bladder well. His symptoms seem somewhat variable day to day.  He was treated with a trial of alfuzosin for about three weeks a couple of months ago, but felt like that made his frequency worse.        He is also concerned with ED.  He has trouble getting and maintaining an erection. He has had issues with ED for several years.  He has taken sildenafil 50mg and it has not been effective.       Frequency-admits   Urgency-sometimes   Incontinence-denies    Nocturia-denies   Perineal pain-over the summer, none currently    Dysuria-denies    Stream-occasionally weak   GH-denies    History of stones-denies     surgeries-vasectomy    Family history of  malignancy-denies    Cardiopulmonary-tachycardia   Anticoagulants-none   Smoker-denies      PSA  6/25/2024 1.29       Objective     Past Medical History:   Diagnosis Date    Colon polyp     History of medical problems 2008    Tachycardia    Inguinal hernia     Tachycardia     only sees PCP, controlled for past 15 years       Past Surgical History:   Procedure Laterality Date    COLONOSCOPY      COLONOSCOPY N/A 7/1/2024    Procedure: COLONOSCOPY WITH COLD SNARE POLYPECTOMY;  Surgeon: Kaushik Epps MD;  Location: [facility];  Service: Gastroenterology;   "Laterality: N/A;  COLON POLYP    HERNIA REPAIR  2021    INGUINAL HERNIA REPAIR Left 12/20/2021    Procedure: INGUINAL HERNIA REPAIR LAPAROSCOPIC WITH DAVINCI ROBOT, left;  Surgeon: Corona Kolb MD;  Location: [facility] MAIN OR;  Service: Robotics - DaVinci;  Laterality: Left;    INGUINAL HERNIA REPAIR  2021    VASECTOMY           Current Outpatient Medications:     carvedilol (COREG) 3.125 MG tablet, Take 1 tablet by mouth 2 (Two) Times a Day., Disp: 180 tablet, Rfl: 3    diclofenac (VOLTAREN) 75 MG EC tablet, Take 1 tablet by mouth 2 (Two) Times a Day., Disp: 60 tablet, Rfl: 0    montelukast (SINGULAIR) 10 MG tablet, Take 1 tablet by mouth Every Night., Disp: 90 tablet, Rfl: 3    Probiotic Product (PROBIOTIC DAILY PO), Take  by mouth., Disp: , Rfl:     tadalafil (CIALIS) 5 MG tablet, Take 1 tablet by mouth Daily., Disp: 30 tablet, Rfl: 2    vitamin D (ERGOCALCIFEROL) 1.25 MG (90938 UT) capsule capsule, Take 1 capsule by mouth Every 7 (Seven) Days., Disp: 12 capsule, Rfl: 1    Allergies   Allergen Reactions    Sulfamethoxazole-Trimethoprim Rash and Other (See Comments)     Elevated liver enzymes        Family History   Problem Relation Age of Onset    Alcohol abuse Father     Colon cancer Maternal Uncle        Social History     Socioeconomic History    Marital status:    Tobacco Use    Smoking status: Never     Passive exposure: Past    Smokeless tobacco: Never   Vaping Use    Vaping status: Never Used   Substance and Sexual Activity    Alcohol use: Never    Drug use: Never    Sexual activity: Defer       Vital Signs:   Resp 14   Ht 175.3 cm (69\")   Wt 79.4 kg (175 lb 0.7 oz)   BMI 25.85 kg/m²      Physical Exam  Vitals reviewed.   Constitutional:       Appearance: Normal appearance.   Neurological:      General: No focal deficit present.      Mental Status: He is alert and oriented to person, place, and time.   Psychiatric:         Mood and Affect: Mood normal.         Behavior: Behavior normal.      "     Result Review :   The following data was reviewed by: TC Allred on 02/21/2025:  Results for orders placed or performed in visit on 02/21/25   Bladder Scan    Collection Time: 02/21/25  8:08 AM   Result Value Ref Range    Urine Volume 27       PSA          6/25/2024    09:32   PSA   PSA 1.290      Bladder Scan interpretation 02/21/2025    Estimation of residual urine via BVI 3000 Verathon Bladder Scan  MA/nurse performing: Yasmin MARTÍNEZ MA  Residual Urine: 27 ml  Indication: Benign prostatic hyperplasia with lower urinary tract symptoms, symptom details unspecified    Other male erectile dysfunction   Position: Supine  Examination: Incremental scanning of the suprapubic area using 2.0 MHz transducer using copious amounts of acoustic gel.   Findings: An anechoic area was demonstrated which represented the bladder, with measurement of residual urine as noted. I inspected this myself. In that the residual urine was stable or insignificant, refer to plan for treatment and plan necessary at this time.         Procedures        Assessment and Plan    Diagnoses and all orders for this visit:    1. Benign prostatic hyperplasia with lower urinary tract symptoms, symptom details unspecified (Primary)  -     Bladder Scan    2. Other male erectile dysfunction    Given that he is doing well with tadalafil, we will continue with this medication.  We did discuss that he could have his PSA completed annually or every other year depending on his preference since it was within normal limits.  We also discussed the progression of BPH.    I will plan to see him back in 1 year, but he does understand that he can call for sooner appointment if his symptoms should change.        Follow Up   Return in about 1 year (around 2/21/2026).  Patient was given instructions and counseling regarding his condition or for health maintenance advice. Please see specific information pulled into the AVS if appropriate.         This  document has been electronically signed by TC Allred  February 21, 2025 08:28 EST

## 2025-02-21 ENCOUNTER — OFFICE VISIT (OUTPATIENT)
Dept: UROLOGY | Age: 55
End: 2025-02-21
Payer: COMMERCIAL

## 2025-02-21 VITALS — RESPIRATION RATE: 14 BRPM | BODY MASS INDEX: 25.93 KG/M2 | WEIGHT: 175.04 LBS | HEIGHT: 69 IN

## 2025-02-21 DIAGNOSIS — N40.1 BENIGN PROSTATIC HYPERPLASIA WITH LOWER URINARY TRACT SYMPTOMS, SYMPTOM DETAILS UNSPECIFIED: Primary | ICD-10-CM

## 2025-02-21 DIAGNOSIS — N52.8 OTHER MALE ERECTILE DYSFUNCTION: ICD-10-CM

## 2025-02-21 LAB — URINE VOLUME: 27

## 2025-02-21 RX ORDER — TADALAFIL 5 MG/1
5 TABLET ORAL DAILY
Qty: 90 TABLET | Refills: 3 | Status: SHIPPED | OUTPATIENT
Start: 2025-02-21 | End: 2026-02-16

## 2025-03-04 ENCOUNTER — OFFICE VISIT (OUTPATIENT)
Dept: ORTHOPEDIC SURGERY | Facility: CLINIC | Age: 55
End: 2025-03-04
Payer: COMMERCIAL

## 2025-03-04 VITALS — HEIGHT: 69 IN | OXYGEN SATURATION: 98 % | HEART RATE: 76 BPM | WEIGHT: 180 LBS | BODY MASS INDEX: 26.66 KG/M2

## 2025-03-04 DIAGNOSIS — M25.522 LEFT ELBOW PAIN: ICD-10-CM

## 2025-03-04 DIAGNOSIS — M75.22 BICEPS TENDONITIS ON LEFT: Primary | ICD-10-CM

## 2025-03-04 RX ORDER — DIAZEPAM 10 MG/1
10 TABLET ORAL
Qty: 1 TABLET | Refills: 0 | Status: SHIPPED | OUTPATIENT
Start: 2025-03-04

## 2025-03-04 NOTE — PROGRESS NOTES
"Chief Complaint  Pain and Initial Evaluation of the Left Elbow       Subjective      Lavell Pires presents to [facility] for an evaluation  of his left elbow. His left elbow has been bothering him for about a year but denies any specific injury, trauma, falls or prior surgery to his left elbow. He has pain with lifting anything heavy. He has been to outpatient physical therapy and has tried dry needling. He has tried anti inflammatories in the past without much relief.     Allergies   Allergen Reactions    Sulfamethoxazole-Trimethoprim Rash and Other (See Comments)     Elevated liver enzymes        Social History     Socioeconomic History    Marital status:    Tobacco Use    Smoking status: Never     Passive exposure: Past    Smokeless tobacco: Never   Vaping Use    Vaping status: Never Used   Substance and Sexual Activity    Alcohol use: Never    Drug use: Never    Sexual activity: Defer        I reviewed the patient's chief complaint, history of present illness, review of systems, past medical history, surgical history, family history, social history, medications, and allergy list.     Review of Systems     Constitutional: Denies fevers, chills, weight loss  Cardiovascular: Denies chest pain, shortness of breath  Skin: Denies rashes, acute skin changes  Neurologic: Denies headache, loss of consciousness  MSK: left elbow pain       Vital Signs:   Pulse 76   Ht 175.3 cm (69\")   Wt 81.6 kg (180 lb)   SpO2 98%   BMI 26.58 kg/m²            Ortho Exam    Physical Exam  General:Alert. No acute distress     Left upper extremity: full extension, flexion  to 155 degrees, intact supination  and pronation, tender to the anterior  elbow, 4 plus supination  strength, flexion  strength intact, no deformity, neurovascularly intact, sensation intact to the medial, radial and ulnar nerve, positive  pulses     Procedures    X-Ray Report:  Left elbow X-Ray  Indication: Evaluation of " left elbow pain   AP/Lateral view(s)  Findings: no acute fracture, joint space preserved, no effusion   Prior studies available for comparison: no       Imaging Results (Most Recent)       Procedure Component Value Units Date/Time    XR Elbow 3+ View Left [[id number]] Resulted: 03/04/25 1544     Updated: 03/04/25 1544             Result Review :       No results found.           Assessment and Plan     Diagnoses and all orders for this visit:    1. Biceps tendonitis on left (Primary)    2. Left elbow pain  -     XR Elbow 3+ View Left        The patient presents here today for an evaluation  of his left elbow. X-rays were obtained in the office today and these were reviewed today.     MRI order placed today to evaluate for internal derangement.     Home exercises given today and will continue over the counter medications for pain control     Valium 10 mg sent into the pharmacy today to take prior to his MRI     Call or return if worsening symptoms.    Follow Up     After MRI     Patient was given instructions and counseling regarding his condition or for health maintenance advice. Please see specific information pulled into the AVS if appropriate.     Scribed for Corona Fernandes MD by Maine Whitten.  03/04/25   15:36 EST    I have personally performed the services described in this document as scribed by the above individual and it is both accurate and complete. Corona Fernandes MD 03/05/25

## 2025-03-13 RX ORDER — CARVEDILOL 3.12 MG/1
3.12 TABLET ORAL 2 TIMES DAILY
Qty: 180 TABLET | Refills: 3 | Status: SHIPPED | OUTPATIENT
Start: 2025-03-13

## 2025-03-17 ENCOUNTER — TELEPHONE (OUTPATIENT)
Dept: UROLOGY | Age: 55
End: 2025-03-17
Payer: COMMERCIAL

## 2025-03-17 NOTE — TELEPHONE ENCOUNTER
[facility] IN [place] CALLED TO GET THE PATIENT A 1 WEEK POST OP APPT. HE HAD SURGERY FOR KIDNEY STONE YESTERDAY AND NEEDS TO F/U WITH US. HE HAS A STENT. PLEASE CALL HIS WIFE'S PHONE WITH THE APPT. DETAILS.

## 2025-03-17 NOTE — TELEPHONE ENCOUNTER
He will have to be set up with one of the urologist it does look like they checked the strings, so he will have to have a scope to have this removed.

## 2025-03-17 NOTE — TELEPHONE ENCOUNTER
CALLED PT WIFE TO SCHEDULE APPT W/KALYN/PT WIFE SAID THAT SHE WILL CALL US BACK SHE WAS IN A CROWD

## 2025-03-17 NOTE — TELEPHONE ENCOUNTER
I need his op report and urology note if there is 1.  Does he have a string or does he need cystoscopy to remove his stent?

## 2025-03-22 PROBLEM — N20.0 NEPHROLITHIASIS: Status: ACTIVE | Noted: 2025-03-22

## 2025-03-22 NOTE — PROGRESS NOTES
Cytoscopy with Stent Removal     Pre-Procedure Diagnosis:     Nephrolithiasis    Post-Procedural Diagnosis: Same    Procedure Performed: Cystoscopy with Ureteral Stent Removal     Description of the Procedure:      was appropriately identified and brought to the cytoscopy suite. A timeout was undertaken documenting the correct patient, site, and procedure. The patient was prepped in a normal sterile fashion. A flexible cytoscope was then introduced into the bladder. The stent was identified and grasped with endoscopic graspers. The entire stent was removed and passed off the field. Patient tolerated the procedure well. There were no intraprocedural complications.        Assessment and Plan   Diagnoses and all orders for this visit:    1. Nephrolithiasis (Primary)          Current Outpatient Medications:     carvedilol (COREG) 3.125 MG tablet, Take 1 tablet by mouth 2 (Two) Times a Day., Disp: 180 tablet, Rfl: 3    diazePAM (Valium) 10 MG tablet, Take 1 tablet by mouth 1 hour prior to MRI., Disp: 1 tablet, Rfl: 0    diclofenac (VOLTAREN) 75 MG EC tablet, Take 1 tablet by mouth 2 (Two) Times a Day., Disp: 60 tablet, Rfl: 0    montelukast (SINGULAIR) 10 MG tablet, Take 1 tablet by mouth Every Night., Disp: 90 tablet, Rfl: 3    Probiotic Product (PROBIOTIC DAILY PO), Take  by mouth., Disp: , Rfl:     tadalafil (CIALIS) 5 MG tablet, Take 1 tablet by mouth Daily, Disp: 90 tablet, Rfl: 3    vitamin D (ERGOCALCIFEROL) 1.25 MG (39581 UT) capsule capsule, Take 1 capsule by mouth Every 7 (Seven) Days., Disp: 12 capsule, Rfl: 1      Electronically Signed by: Ismael Evangelista MD on 03/25/2025

## 2025-03-25 ENCOUNTER — TELEPHONE (OUTPATIENT)
Dept: UROLOGY | Age: 55
End: 2025-03-25

## 2025-03-25 ENCOUNTER — PROCEDURE VISIT (OUTPATIENT)
Dept: UROLOGY | Age: 55
End: 2025-03-25
Payer: COMMERCIAL

## 2025-03-25 DIAGNOSIS — N20.0 NEPHROLITHIASIS: Primary | ICD-10-CM

## 2025-03-25 PROCEDURE — 52310 CYSTOSCOPY AND TREATMENT: CPT | Performed by: UROLOGY

## 2025-03-25 NOTE — TELEPHONE ENCOUNTER
PATIENT CALLED AND HE HAD A CYSTO WITH STENT PULL TODAY.  HE HAS LOWER ABDOMINAL PAIN, AND HAS THE URGE TO URINATE, BUT IS NOT URINATING.  HE URINATED A LITTLE AFTER THE PROCEDURE.  HE HASN'T HAD MUCH TO DRINK TODAY.    HE WANTS TO KNOW IF THIS IS NORMAL.  WHAT CAN HE TAKE FOR THE ABDOMINAL PAIN?    #[phone]

## 2025-03-26 RX ORDER — ERGOCALCIFEROL 1.25 MG/1
50000 CAPSULE, LIQUID FILLED ORAL
Qty: 12 CAPSULE | Refills: 1 | Status: SHIPPED | OUTPATIENT
Start: 2025-03-26

## 2025-04-11 ENCOUNTER — OFFICE VISIT (OUTPATIENT)
Dept: INTERNAL MEDICINE | Facility: CLINIC | Age: 55
End: 2025-04-11
Payer: COMMERCIAL

## 2025-04-11 VITALS
OXYGEN SATURATION: 97 % | TEMPERATURE: 97.6 F | HEIGHT: 69 IN | DIASTOLIC BLOOD PRESSURE: 78 MMHG | HEART RATE: 72 BPM | SYSTOLIC BLOOD PRESSURE: 128 MMHG | WEIGHT: 183.13 LBS | BODY MASS INDEX: 27.12 KG/M2 | RESPIRATION RATE: 18 BRPM

## 2025-04-11 DIAGNOSIS — Z00.00 ANNUAL PHYSICAL EXAM: Primary | ICD-10-CM

## 2025-04-11 PROCEDURE — 80061 LIPID PANEL: CPT | Performed by: INTERNAL MEDICINE

## 2025-04-11 PROCEDURE — 99396 PREV VISIT EST AGE 40-64: CPT | Performed by: INTERNAL MEDICINE

## 2025-04-11 PROCEDURE — 36415 COLL VENOUS BLD VENIPUNCTURE: CPT | Performed by: INTERNAL MEDICINE

## 2025-04-11 PROCEDURE — 84443 ASSAY THYROID STIM HORMONE: CPT | Performed by: INTERNAL MEDICINE

## 2025-04-12 LAB
CHOLEST SERPL-MCNC: 205 MG/DL (ref 0–200)
HDLC SERPL-MCNC: 42 MG/DL (ref 40–60)
LDLC SERPL CALC-MCNC: 148 MG/DL (ref 0–100)
LDLC/HDLC SERPL: 3.48 {RATIO}
TRIGL SERPL-MCNC: 85 MG/DL (ref 0–150)
TSH SERPL DL<=0.05 MIU/L-ACNC: 0.98 UIU/ML (ref 0.27–4.2)
VLDLC SERPL-MCNC: 15 MG/DL (ref 5–40)

## 2025-04-14 ENCOUNTER — TELEPHONE (OUTPATIENT)
Dept: INTERNAL MEDICINE | Facility: CLINIC | Age: 55
End: 2025-04-14
Payer: COMMERCIAL

## 2025-04-14 NOTE — TELEPHONE ENCOUNTER
Caller: Lavell Pires    Relationship to patient: Self    Best call back number: [phone]     Patient is needing: PATIENT IS REQUESTING A CALL BACK ABOUT HIS TEST RESULTS. PLEASE CALL AND ADVISE

## 2025-04-14 NOTE — TELEPHONE ENCOUNTER
Results reviewed and result note sent on [facility].   Please call patient to review results.

## 2025-04-16 ENCOUNTER — HOSPITAL ENCOUNTER (OUTPATIENT)
Dept: ULTRASOUND IMAGING | Facility: HOSPITAL | Age: 55
Discharge: HOME | End: 2025-04-16
Admitting: UROLOGY
Payer: COMMERCIAL

## 2025-04-16 DIAGNOSIS — N20.0 NEPHROLITHIASIS: ICD-10-CM

## 2025-04-16 PROCEDURE — 76775 US EXAM ABDO BACK WALL LIM: CPT

## 2025-04-17 ENCOUNTER — TELEPHONE (OUTPATIENT)
Dept: UROLOGY | Age: 55
End: 2025-04-17

## 2025-04-17 NOTE — TELEPHONE ENCOUNTER
Caller: Lavell Pires    Relationship: Self    Best call back number: [phone]    Caller requesting test results: PT    What test was performed: RENAL U/S    When was the test performed: 04.16.2025    Where was the test performed: [facility]    Additional notes: IS IT OK TO CANCEL APPT ON 04.28.2025 AND CALL PT WITH RESULTS? THANK YOU

## 2025-04-21 ENCOUNTER — TELEPHONE (OUTPATIENT)
Dept: UROLOGY | Age: 55
End: 2025-04-21
Payer: COMMERCIAL

## 2025-04-21 NOTE — TELEPHONE ENCOUNTER
Name: Lavell Pires      Relationship: Self      Best Callback Number:   Telephone Information:   Mobile [phone]          HUB PROVIDED THE RELAY MESSAGE FROM THE OFFICE      PATIENT: VOICED UNDERSTANDING AND HAS NO FURTHER QUESTIONS AT THIS TIME    ADDITIONAL INFORMATION:

## 2025-04-21 NOTE — TELEPHONE ENCOUNTER
LVM asking pt to call back.   Ultrasound negative for stones or other post op abnormality. Did show enlarged prostate which pt has already been treated for by [facility]. Pt does not need to see Evangelista on 04/28/25, we will just mail him kidney stone prevention education. He will need to see Rowan in 1 year from the time he last saw her so in February 2026, for the enlarged prostate.    Hub/answering service please relay above information to pt and send back to me so that I can cancel his appt on 4/28 and put in a recall for him to see NP in Feb 2026/ send kidney stone prevention education to him.

## 2025-05-14 NOTE — PROGRESS NOTES
"Chief Complaint  Annual Exam (Wants left ear looked at, has been hurting )    Subjective      Lavell Pires is a 54 y.o. male who presents to [facility]     Presenting for annual physical exam.    Complains of left ear pain and would like this examined.    Objective   Vital Signs:   Vitals:    04/11/25 1323   BP: 128/78   BP Location: Left arm   Patient Position: Sitting   Cuff Size: Adult   Pulse: 72   Resp: 18   Temp: 97.6 °F (36.4 °C)   TempSrc: Temporal   SpO2: 97%   Weight: 83.1 kg (183 lb 2 oz)   Height: 175.3 cm (69\")     Body mass index is 27.04 kg/m².    Wt Readings from Last 3 Encounters:   04/11/25 83.1 kg (183 lb 2 oz)   03/04/25 81.6 kg (180 lb)   02/21/25 79.4 kg (175 lb 0.7 oz)     BP Readings from Last 3 Encounters:   04/11/25 128/78   12/10/24 128/86   07/31/24 116/80       Health Maintenance   Topic Date Due    Pneumococcal Vaccine 50+ (1 of 1 - PCV) Never done    ZOSTER VACCINE (1 of 2) Never done    COVID-19 Vaccine (4 - 2024-25 season) 09/01/2024    ANNUAL PHYSICAL  03/14/2025    INFLUENZA VACCINE  07/01/2025    TDAP/TD VACCINES (2 - Td or Tdap) 12/23/2026    COLORECTAL CANCER SCREENING  07/01/2029    HEPATITIS C SCREENING  Completed       Physical Exam  Vitals reviewed.   Constitutional:       Appearance: Normal appearance. He is well-developed.   HENT:      Head: Normocephalic and atraumatic.      Right Ear: Tympanic membrane and ear canal normal.      Left Ear: Tympanic membrane and ear canal normal.      Mouth/Throat:      Pharynx: No oropharyngeal exudate.   Eyes:      Conjunctiva/sclera: Conjunctivae normal.      Pupils: Pupils are equal, round, and reactive to light.   Neck:      Thyroid: No thyromegaly or thyroid tenderness.   Cardiovascular:      Rate and Rhythm: Normal rate and regular rhythm.      Heart sounds: No murmur heard.     No friction rub. No gallop.   Pulmonary:      Effort: Pulmonary effort is normal.      Breath " sounds: Normal breath sounds. No wheezing or rhonchi.   Lymphadenopathy:      Cervical: No cervical adenopathy.   Skin:     General: Skin is warm and dry.   Neurological:      Mental Status: He is alert and oriented to person, place, and time.   Psychiatric:         Mood and Affect: Affect normal.          Result Review :  The following data was reviewed by: Abi Suarez MD on 04/11/2025:      Lipid Panel          4/11/2025    13:52   Lipid Panel   Total Cholesterol 205    Triglycerides 85    HDL Cholesterol 42    VLDL Cholesterol 15    LDL Cholesterol  148    LDL/HDL Ratio 3.48      TSH          4/11/2025    13:52   TSH   TSH 0.976               Procedures          Assessment & Plan  Annual physical exam  Screening labs reviewed/ordered  Counseling provided regarding age appropriate screenings and immunizations, healthy diet and exercise.     Orders:    TSH Rfx On Abnormal To Free T4    Lipid Panel                   FOLLOW UP  Return in about 1 year (around 4/11/2026) for Annual physical, or sooner if needed.  Patient was given instructions and counseling regarding his condition or for health maintenance advice. Please see specific information pulled into the AVS if appropriate.       Abi Suaerz MD  05/14/25  11:31 EDT    CURRENT & DISCONTINUED MEDICATIONS  Current Outpatient Medications   Medication Instructions    carvedilol (COREG) 3.125 mg, Oral, 2 Times Daily    diazePAM (Valium) 10 MG tablet Take 1 tablet by mouth 1 hour prior to MRI.    montelukast (SINGULAIR) 10 mg, Oral, Nightly    Probiotic Product (PROBIOTIC DAILY PO) Take  by mouth.    tadalafil (CIALIS) 5 MG tablet Take 1 tablet by mouth Daily    vitamin D (ERGOCALCIFEROL) 50,000 Units, Oral, Every 7 Days       Medications Discontinued During This Encounter   Medication Reason    diclofenac (VOLTAREN) 75 MG EC tablet

## 2025-05-14 NOTE — ASSESSMENT & PLAN NOTE
Screening labs reviewed/ordered  Counseling provided regarding age appropriate screenings and immunizations, healthy diet and exercise.     Orders:    TSH Rfx On Abnormal To Free T4    Lipid Panel

## (undated) DEVICE — TOTAL TRAY, 16FR 10ML SIL FOLEY, URN: Brand: MEDLINE

## (undated) DEVICE — LINER SURG CANSTR SXN S/RIGD 1500CC

## (undated) DEVICE — 3 RING SUTURE PASSER - 16 CM: Brand: 3 RING SUTURE PASSER - 16 CM

## (undated) DEVICE — ARM DRAPE

## (undated) DEVICE — APPL CHLORAPREP HI/LITE 26ML ORNG

## (undated) DEVICE — SNAR POLYP CAPTIFLEX XS/OVL 11X2.4MM 240CM 1P/U

## (undated) DEVICE — CONN JET HYDRA H20 AUXILIARY DISP

## (undated) DEVICE — ANTIBACTERIAL UNDYED BRAIDED (POLYGLACTIN 910), SYNTHETIC ABSORBABLE SUTURE: Brand: COATED VICRYL

## (undated) DEVICE — ENDOPATH PNEUMONEEDLE INSUFFLATION NEEDLES WITH LUER LOCK CONNECTORS 120MM: Brand: ENDOPATH

## (undated) DEVICE — CANNULA SEAL

## (undated) DEVICE — STERILE POLYISOPRENE POWDER-FREE SURGICAL GLOVES WITH EMOLLIENT COATING: Brand: PROTEXIS

## (undated) DEVICE — SLV SCD LEG COMFORT KENDALLSCD MD REPROC

## (undated) DEVICE — VISUALIZATION SYSTEM: Brand: CLEARIFY

## (undated) DEVICE — Device

## (undated) DEVICE — Device: Brand: DEFENDO AIR/WATER/SUCTION AND BIOPSY VALVE

## (undated) DEVICE — GLV SURG SENSICARE SLT PF LF 7.5 STRL

## (undated) DEVICE — SOL IRRG H2O PL/BG 1000ML STRL

## (undated) DEVICE — SUT VIC PLS CTD BR 0 TIE 18IN VIL

## (undated) DEVICE — TIP COVER ACCESSORY

## (undated) DEVICE — SOLIDIFIER LIQLOC PLS 1500CC BT

## (undated) DEVICE — GLV SURG SENSICARE SLT PF LF 6.5 STRL

## (undated) DEVICE — DAVINCI-LF: Brand: MEDLINE INDUSTRIES, INC.

## (undated) DEVICE — ENDOPATH XCEL WITH OPTIVIEW TECHNOLOGY BLADELESS TROCARS WITH STABILITY SLEEVES: Brand: ENDOPATH XCEL OPTIVIEW

## (undated) DEVICE — THE SINGLE USE ETRAP – POLYP TRAP IS USED FOR SUCTION RETRIEVAL OF ENDOSCOPICALLY REMOVED POLYPS.: Brand: ETRAP